# Patient Record
Sex: FEMALE | Race: WHITE | NOT HISPANIC OR LATINO | Employment: OTHER | URBAN - METROPOLITAN AREA
[De-identification: names, ages, dates, MRNs, and addresses within clinical notes are randomized per-mention and may not be internally consistent; named-entity substitution may affect disease eponyms.]

---

## 2018-02-26 ENCOUNTER — HOSPITAL LABORATORY (OUTPATIENT)
Dept: OTHER | Facility: CLINIC | Age: 58
End: 2018-02-26

## 2018-02-26 ENCOUNTER — HOSPITAL ENCOUNTER (OUTPATIENT)
Dept: LAB | Facility: CLINIC | Age: 58
Discharge: HOME | End: 2018-02-26
Attending: ORTHOPAEDIC SURGERY | Admitting: ORTHOPAEDIC SURGERY
Payer: COMMERCIAL

## 2018-02-26 LAB — HGB BLD-MCNC: 14.9 G/DL (ref 11.7–15.7)

## 2018-02-26 PROCEDURE — 87641 MR-STAPH DNA AMP PROBE: CPT | Performed by: ORTHOPAEDIC SURGERY

## 2018-02-26 PROCEDURE — 40000830 ZZHCL STATISTIC STAPH AUREUS METH RESIST SCREEN PCR: Performed by: ORTHOPAEDIC SURGERY

## 2018-02-26 PROCEDURE — 87640 STAPH A DNA AMP PROBE: CPT | Performed by: ORTHOPAEDIC SURGERY

## 2018-02-26 PROCEDURE — 40000829 ZZHCL STATISTIC STAPH AUREUS SUSCEPT SCREEN PCR: Performed by: ORTHOPAEDIC SURGERY

## 2018-02-27 ENCOUNTER — TRANSFERRED RECORDS (OUTPATIENT)
Dept: HEALTH INFORMATION MANAGEMENT | Facility: CLINIC | Age: 58
End: 2018-02-27

## 2018-03-23 ENCOUNTER — APPOINTMENT (OUTPATIENT)
Dept: GENERAL RADIOLOGY | Facility: CLINIC | Age: 58
DRG: 470 | End: 2018-03-23
Attending: ORTHOPAEDIC SURGERY
Payer: COMMERCIAL

## 2018-03-23 ENCOUNTER — ANESTHESIA EVENT (OUTPATIENT)
Dept: SURGERY | Facility: CLINIC | Age: 58
DRG: 470 | End: 2018-03-23
Payer: COMMERCIAL

## 2018-03-23 ENCOUNTER — ANESTHESIA (OUTPATIENT)
Dept: SURGERY | Facility: CLINIC | Age: 58
DRG: 470 | End: 2018-03-23
Payer: COMMERCIAL

## 2018-03-23 ENCOUNTER — HOSPITAL ENCOUNTER (INPATIENT)
Facility: CLINIC | Age: 58
LOS: 3 days | Discharge: HOME | DRG: 470 | End: 2018-03-26
Attending: ORTHOPAEDIC SURGERY | Admitting: ORTHOPAEDIC SURGERY
Payer: COMMERCIAL

## 2018-03-23 DIAGNOSIS — M17.11 ARTHRITIS OF RIGHT KNEE: Primary | ICD-10-CM

## 2018-03-23 LAB
ABO + RH BLD: NORMAL
ABO + RH BLD: NORMAL
BLD GP AB SCN SERPL QL: NORMAL
BLOOD BANK CMNT PATIENT-IMP: NORMAL
CREAT SERPL-MCNC: 0.84 MG/DL (ref 0.52–1.04)
GFR SERPL CREATININE-BSD FRML MDRD: 69 ML/MIN/1.7M2
HGB BLD-MCNC: 15.1 G/DL (ref 11.7–15.7)
PLATELET # BLD AUTO: 130 10E9/L (ref 150–450)
POTASSIUM SERPL-SCNC: 3.9 MMOL/L (ref 3.4–5.3)
SPECIMEN EXP DATE BLD: NORMAL

## 2018-03-23 PROCEDURE — 36000063 ZZH SURGERY LEVEL 4 EA 15 ADDTL MIN: Performed by: ORTHOPAEDIC SURGERY

## 2018-03-23 PROCEDURE — 25000132 ZZH RX MED GY IP 250 OP 250 PS 637: Performed by: ORTHOPAEDIC SURGERY

## 2018-03-23 PROCEDURE — 86850 RBC ANTIBODY SCREEN: CPT | Performed by: PHYSICIAN ASSISTANT

## 2018-03-23 PROCEDURE — 25000125 ZZHC RX 250: Performed by: PHYSICIAN ASSISTANT

## 2018-03-23 PROCEDURE — 40000986 XR KNEE PORT RT 1/2 VW: Mod: RT

## 2018-03-23 PROCEDURE — 25000128 H RX IP 250 OP 636: Performed by: SURGERY

## 2018-03-23 PROCEDURE — 25000132 ZZH RX MED GY IP 250 OP 250 PS 637: Performed by: PHYSICIAN ASSISTANT

## 2018-03-23 PROCEDURE — 27211024 ZZHC OR SUPPLY OTHER OPNP: Performed by: ORTHOPAEDIC SURGERY

## 2018-03-23 PROCEDURE — C1776 JOINT DEVICE (IMPLANTABLE): HCPCS | Performed by: ORTHOPAEDIC SURGERY

## 2018-03-23 PROCEDURE — 25000125 ZZHC RX 250: Performed by: NURSE ANESTHETIST, CERTIFIED REGISTERED

## 2018-03-23 PROCEDURE — 25000125 ZZHC RX 250: Performed by: SURGERY

## 2018-03-23 PROCEDURE — 40000170 ZZH STATISTIC PRE-PROCEDURE ASSESSMENT II: Performed by: ORTHOPAEDIC SURGERY

## 2018-03-23 PROCEDURE — 25000128 H RX IP 250 OP 636

## 2018-03-23 PROCEDURE — 71000012 ZZH RECOVERY PHASE 1 LEVEL 1 FIRST HR: Performed by: ORTHOPAEDIC SURGERY

## 2018-03-23 PROCEDURE — 27210794 ZZH OR GENERAL SUPPLY STERILE: Performed by: ORTHOPAEDIC SURGERY

## 2018-03-23 PROCEDURE — 36000093 ZZH SURGERY LEVEL 4 1ST 30 MIN: Performed by: ORTHOPAEDIC SURGERY

## 2018-03-23 PROCEDURE — 25000128 H RX IP 250 OP 636: Performed by: ORTHOPAEDIC SURGERY

## 2018-03-23 PROCEDURE — 27210995 ZZH RX 272: Performed by: ORTHOPAEDIC SURGERY

## 2018-03-23 PROCEDURE — C1713 ANCHOR/SCREW BN/BN,TIS/BN: HCPCS | Performed by: ORTHOPAEDIC SURGERY

## 2018-03-23 PROCEDURE — 86901 BLOOD TYPING SEROLOGIC RH(D): CPT | Performed by: PHYSICIAN ASSISTANT

## 2018-03-23 PROCEDURE — 12000007 ZZH R&B INTERMEDIATE

## 2018-03-23 PROCEDURE — 85049 AUTOMATED PLATELET COUNT: CPT | Performed by: PHYSICIAN ASSISTANT

## 2018-03-23 PROCEDURE — 0SRC0J9 REPLACEMENT OF RIGHT KNEE JOINT WITH SYNTHETIC SUBSTITUTE, CEMENTED, OPEN APPROACH: ICD-10-PCS | Performed by: ORTHOPAEDIC SURGERY

## 2018-03-23 PROCEDURE — 86900 BLOOD TYPING SEROLOGIC ABO: CPT | Performed by: PHYSICIAN ASSISTANT

## 2018-03-23 PROCEDURE — 25000128 H RX IP 250 OP 636: Performed by: PHYSICIAN ASSISTANT

## 2018-03-23 PROCEDURE — 25000125 ZZHC RX 250: Performed by: ORTHOPAEDIC SURGERY

## 2018-03-23 PROCEDURE — 37000008 ZZH ANESTHESIA TECHNICAL FEE, 1ST 30 MIN: Performed by: ORTHOPAEDIC SURGERY

## 2018-03-23 PROCEDURE — 84132 ASSAY OF SERUM POTASSIUM: CPT | Performed by: PHYSICIAN ASSISTANT

## 2018-03-23 PROCEDURE — 36415 COLL VENOUS BLD VENIPUNCTURE: CPT | Performed by: PHYSICIAN ASSISTANT

## 2018-03-23 PROCEDURE — 27810169 ZZH OR IMPLANT GENERAL: Performed by: ORTHOPAEDIC SURGERY

## 2018-03-23 PROCEDURE — 25000128 H RX IP 250 OP 636: Performed by: NURSE ANESTHETIST, CERTIFIED REGISTERED

## 2018-03-23 PROCEDURE — 37000009 ZZH ANESTHESIA TECHNICAL FEE, EACH ADDTL 15 MIN: Performed by: ORTHOPAEDIC SURGERY

## 2018-03-23 PROCEDURE — 82565 ASSAY OF CREATININE: CPT | Performed by: PHYSICIAN ASSISTANT

## 2018-03-23 PROCEDURE — 25000128 H RX IP 250 OP 636: Performed by: ANESTHESIOLOGY

## 2018-03-23 PROCEDURE — 85018 HEMOGLOBIN: CPT | Performed by: PHYSICIAN ASSISTANT

## 2018-03-23 PROCEDURE — 25800025 ZZH RX 258: Performed by: ORTHOPAEDIC SURGERY

## 2018-03-23 DEVICE — IMPLANTABLE DEVICE: Type: IMPLANTABLE DEVICE | Site: KNEE | Status: FUNCTIONAL

## 2018-03-23 DEVICE — BONE CEMENT SIMPLEX W/TOBRAMYCIN 6197-9-001: Type: IMPLANTABLE DEVICE | Status: FUNCTIONAL

## 2018-03-23 DEVICE — IMP COMP TIBIAL TRAY SNN JOURNEY NP RT SZ 5 74022215: Type: IMPLANTABLE DEVICE | Site: KNEE | Status: FUNCTIONAL

## 2018-03-23 RX ORDER — KETOROLAC TROMETHAMINE 30 MG/ML
30 INJECTION, SOLUTION INTRAMUSCULAR; INTRAVENOUS EVERY 6 HOURS
Status: COMPLETED | OUTPATIENT
Start: 2018-03-23 | End: 2018-03-24

## 2018-03-23 RX ORDER — LISINOPRIL/HYDROCHLOROTHIAZIDE 10-12.5 MG
1 TABLET ORAL DAILY
Status: DISCONTINUED | OUTPATIENT
Start: 2018-03-23 | End: 2018-03-26 | Stop reason: HOSPADM

## 2018-03-23 RX ORDER — VANCOMYCIN HCL 900 MCG/MG
POWDER (GRAM) MISCELLANEOUS PRN
Status: DISCONTINUED | OUTPATIENT
Start: 2018-03-23 | End: 2018-03-23 | Stop reason: HOSPADM

## 2018-03-23 RX ORDER — LANOLIN ALCOHOL/MO/W.PET/CERES
3 CREAM (GRAM) TOPICAL AT BEDTIME
Status: DISCONTINUED | OUTPATIENT
Start: 2018-03-23 | End: 2018-03-26 | Stop reason: HOSPADM

## 2018-03-23 RX ORDER — SODIUM CHLORIDE 9 MG/ML
INJECTION, SOLUTION INTRAVENOUS CONTINUOUS
Status: DISCONTINUED | OUTPATIENT
Start: 2018-03-23 | End: 2018-03-26 | Stop reason: HOSPADM

## 2018-03-23 RX ORDER — ONDANSETRON 2 MG/ML
4 INJECTION INTRAMUSCULAR; INTRAVENOUS EVERY 6 HOURS PRN
Status: DISCONTINUED | OUTPATIENT
Start: 2018-03-23 | End: 2018-03-26 | Stop reason: HOSPADM

## 2018-03-23 RX ORDER — HYDROMORPHONE HYDROCHLORIDE 1 MG/ML
.3-.5 INJECTION, SOLUTION INTRAMUSCULAR; INTRAVENOUS; SUBCUTANEOUS EVERY 5 MIN PRN
Status: DISCONTINUED | OUTPATIENT
Start: 2018-03-23 | End: 2018-03-23 | Stop reason: HOSPADM

## 2018-03-23 RX ORDER — FERROUS SULFATE 325(65) MG
325 TABLET ORAL
Status: DISCONTINUED | OUTPATIENT
Start: 2018-03-23 | End: 2018-03-26 | Stop reason: HOSPADM

## 2018-03-23 RX ORDER — LIDOCAINE 40 MG/G
CREAM TOPICAL
Status: DISCONTINUED | OUTPATIENT
Start: 2018-03-23 | End: 2018-03-26 | Stop reason: HOSPADM

## 2018-03-23 RX ORDER — ONDANSETRON 2 MG/ML
4 INJECTION INTRAMUSCULAR; INTRAVENOUS EVERY 30 MIN PRN
Status: DISCONTINUED | OUTPATIENT
Start: 2018-03-23 | End: 2018-03-23 | Stop reason: HOSPADM

## 2018-03-23 RX ORDER — AMOXICILLIN 250 MG
1 CAPSULE ORAL 2 TIMES DAILY
Status: DISCONTINUED | OUTPATIENT
Start: 2018-03-23 | End: 2018-03-26 | Stop reason: HOSPADM

## 2018-03-23 RX ORDER — CYCLOBENZAPRINE HCL 5 MG
5 TABLET ORAL DAILY PRN
Status: DISCONTINUED | OUTPATIENT
Start: 2018-03-23 | End: 2018-03-26 | Stop reason: HOSPADM

## 2018-03-23 RX ORDER — BUPIVACAINE HYDROCHLORIDE 7.5 MG/ML
INJECTION, SOLUTION INTRASPINAL PRN
Status: DISCONTINUED | OUTPATIENT
Start: 2018-03-23 | End: 2018-03-23

## 2018-03-23 RX ORDER — OXYCODONE HYDROCHLORIDE 5 MG/1
5-10 TABLET ORAL
Status: DISCONTINUED | OUTPATIENT
Start: 2018-03-23 | End: 2018-03-26 | Stop reason: HOSPADM

## 2018-03-23 RX ORDER — MEPERIDINE HYDROCHLORIDE 25 MG/ML
12.5 INJECTION INTRAMUSCULAR; INTRAVENOUS; SUBCUTANEOUS EVERY 5 MIN PRN
Status: DISCONTINUED | OUTPATIENT
Start: 2018-03-23 | End: 2018-03-23 | Stop reason: HOSPADM

## 2018-03-23 RX ORDER — FENTANYL CITRATE 50 UG/ML
25-50 INJECTION, SOLUTION INTRAMUSCULAR; INTRAVENOUS
Status: DISCONTINUED | OUTPATIENT
Start: 2018-03-23 | End: 2018-03-23 | Stop reason: HOSPADM

## 2018-03-23 RX ORDER — PROPOFOL 10 MG/ML
INJECTION, EMULSION INTRAVENOUS CONTINUOUS PRN
Status: DISCONTINUED | OUTPATIENT
Start: 2018-03-23 | End: 2018-03-23

## 2018-03-23 RX ORDER — LEVOTHYROXINE SODIUM 100 UG/1
200 TABLET ORAL
Status: DISCONTINUED | OUTPATIENT
Start: 2018-03-23 | End: 2018-03-26 | Stop reason: HOSPADM

## 2018-03-23 RX ORDER — ACETAMINOPHEN 325 MG/1
975 TABLET ORAL EVERY 8 HOURS
Status: DISCONTINUED | OUTPATIENT
Start: 2018-03-23 | End: 2018-03-26 | Stop reason: HOSPADM

## 2018-03-23 RX ORDER — MAGNESIUM HYDROXIDE 1200 MG/15ML
LIQUID ORAL PRN
Status: DISCONTINUED | OUTPATIENT
Start: 2018-03-23 | End: 2018-03-23 | Stop reason: HOSPADM

## 2018-03-23 RX ORDER — FENTANYL CITRATE 50 UG/ML
INJECTION, SOLUTION INTRAMUSCULAR; INTRAVENOUS PRN
Status: DISCONTINUED | OUTPATIENT
Start: 2018-03-23 | End: 2018-03-23

## 2018-03-23 RX ORDER — FERROUS SULFATE 325(65) MG
325 TABLET ORAL DAILY
COMMUNITY

## 2018-03-23 RX ORDER — KETOROLAC TROMETHAMINE 30 MG/ML
INJECTION, SOLUTION INTRAMUSCULAR; INTRAVENOUS PRN
Status: DISCONTINUED | OUTPATIENT
Start: 2018-03-23 | End: 2018-03-23 | Stop reason: HOSPADM

## 2018-03-23 RX ORDER — SODIUM CHLORIDE, SODIUM LACTATE, POTASSIUM CHLORIDE, CALCIUM CHLORIDE 600; 310; 30; 20 MG/100ML; MG/100ML; MG/100ML; MG/100ML
INJECTION, SOLUTION INTRAVENOUS CONTINUOUS
Status: DISCONTINUED | OUTPATIENT
Start: 2018-03-23 | End: 2018-03-23 | Stop reason: HOSPADM

## 2018-03-23 RX ORDER — FLUTICASONE PROPIONATE 50 MCG
2 SPRAY, SUSPENSION (ML) NASAL DAILY PRN
COMMUNITY

## 2018-03-23 RX ORDER — FLUTICASONE PROPIONATE 50 MCG
2 SPRAY, SUSPENSION (ML) NASAL DAILY PRN
Status: DISCONTINUED | OUTPATIENT
Start: 2018-03-23 | End: 2018-03-26 | Stop reason: HOSPADM

## 2018-03-23 RX ORDER — ONDANSETRON 4 MG/1
4 TABLET, ORALLY DISINTEGRATING ORAL EVERY 30 MIN PRN
Status: DISCONTINUED | OUTPATIENT
Start: 2018-03-23 | End: 2018-03-23 | Stop reason: HOSPADM

## 2018-03-23 RX ORDER — AMOXICILLIN 250 MG
2 CAPSULE ORAL 2 TIMES DAILY
Status: DISCONTINUED | OUTPATIENT
Start: 2018-03-23 | End: 2018-03-26 | Stop reason: HOSPADM

## 2018-03-23 RX ORDER — NALOXONE HYDROCHLORIDE 0.4 MG/ML
.1-.4 INJECTION, SOLUTION INTRAMUSCULAR; INTRAVENOUS; SUBCUTANEOUS
Status: DISCONTINUED | OUTPATIENT
Start: 2018-03-23 | End: 2018-03-26 | Stop reason: HOSPADM

## 2018-03-23 RX ORDER — DIPHENHYDRAMINE HYDROCHLORIDE 50 MG/ML
25 INJECTION INTRAMUSCULAR; INTRAVENOUS EVERY 6 HOURS PRN
Status: DISCONTINUED | OUTPATIENT
Start: 2018-03-23 | End: 2018-03-26 | Stop reason: HOSPADM

## 2018-03-23 RX ORDER — FENTANYL CITRATE 50 UG/ML
25-50 INJECTION, SOLUTION INTRAMUSCULAR; INTRAVENOUS
Status: COMPLETED | OUTPATIENT
Start: 2018-03-23 | End: 2018-03-23

## 2018-03-23 RX ORDER — DIPHENHYDRAMINE HCL 25 MG
25 CAPSULE ORAL EVERY 6 HOURS PRN
Status: DISCONTINUED | OUTPATIENT
Start: 2018-03-23 | End: 2018-03-26 | Stop reason: HOSPADM

## 2018-03-23 RX ORDER — ONDANSETRON 4 MG/1
4 TABLET, ORALLY DISINTEGRATING ORAL EVERY 6 HOURS PRN
Status: DISCONTINUED | OUTPATIENT
Start: 2018-03-23 | End: 2018-03-26 | Stop reason: HOSPADM

## 2018-03-23 RX ORDER — ONDANSETRON 2 MG/ML
INJECTION INTRAMUSCULAR; INTRAVENOUS PRN
Status: DISCONTINUED | OUTPATIENT
Start: 2018-03-23 | End: 2018-03-23

## 2018-03-23 RX ORDER — NALOXONE HYDROCHLORIDE 0.4 MG/ML
.1-.4 INJECTION, SOLUTION INTRAMUSCULAR; INTRAVENOUS; SUBCUTANEOUS
Status: ACTIVE | OUTPATIENT
Start: 2018-03-23 | End: 2018-03-24

## 2018-03-23 RX ADMIN — LEVOTHYROXINE SODIUM 200 MCG: 100 TABLET ORAL at 18:18

## 2018-03-23 RX ADMIN — SERTRALINE HYDROCHLORIDE 150 MG: 100 TABLET ORAL at 18:18

## 2018-03-23 RX ADMIN — PROPOFOL 100 MCG/KG/MIN: 10 INJECTION, EMULSION INTRAVENOUS at 13:38

## 2018-03-23 RX ADMIN — PHENYLEPHRINE HYDROCHLORIDE 100 MCG: 10 INJECTION INTRAVENOUS at 15:20

## 2018-03-23 RX ADMIN — BUPIVACAINE HYDROCHLORIDE 20 ML GIVEN: 2.5 INJECTION, SOLUTION EPIDURAL; INFILTRATION; INTRACAUDAL at 12:19

## 2018-03-23 RX ADMIN — MIDAZOLAM HYDROCHLORIDE 1 MG: 1 INJECTION, SOLUTION INTRAMUSCULAR; INTRAVENOUS at 12:16

## 2018-03-23 RX ADMIN — FERROUS SULFATE TAB 325 MG (65 MG ELEMENTAL FE) 325 MG: 325 (65 FE) TAB at 18:18

## 2018-03-23 RX ADMIN — SENNOSIDES AND DOCUSATE SODIUM 2 TABLET: 8.6; 5 TABLET ORAL at 20:28

## 2018-03-23 RX ADMIN — TRANEXAMIC ACID 1 G: 100 INJECTION, SOLUTION INTRAVENOUS at 15:15

## 2018-03-23 RX ADMIN — PHENYLEPHRINE HYDROCHLORIDE 100 MCG: 10 INJECTION INTRAVENOUS at 15:13

## 2018-03-23 RX ADMIN — BUPIVACAINE HYDROCHLORIDE IN DEXTROSE 13.5 MG: 7.5 INJECTION, SOLUTION SUBARACHNOID at 13:34

## 2018-03-23 RX ADMIN — MELATONIN 3 MG: 3 TAB ORAL at 23:40

## 2018-03-23 RX ADMIN — OXYCODONE HYDROCHLORIDE 10 MG: 5 TABLET ORAL at 23:40

## 2018-03-23 RX ADMIN — OXYCODONE HYDROCHLORIDE 5 MG: 5 TABLET ORAL at 20:28

## 2018-03-23 RX ADMIN — FENTANYL CITRATE 50 MCG: 50 INJECTION INTRAMUSCULAR; INTRAVENOUS at 12:13

## 2018-03-23 RX ADMIN — FENTANYL CITRATE 25 MCG: 50 INJECTION, SOLUTION INTRAMUSCULAR; INTRAVENOUS at 15:09

## 2018-03-23 RX ADMIN — PHENYLEPHRINE HYDROCHLORIDE 100 MCG: 10 INJECTION INTRAVENOUS at 14:23

## 2018-03-23 RX ADMIN — DEXMEDETOMIDINE HYDROCHLORIDE 4 MCG: 100 INJECTION, SOLUTION INTRAVENOUS at 13:57

## 2018-03-23 RX ADMIN — Medication 1.5 G: at 13:30

## 2018-03-23 RX ADMIN — ONDANSETRON 4 MG: 2 INJECTION INTRAMUSCULAR; INTRAVENOUS at 15:33

## 2018-03-23 RX ADMIN — KETOROLAC TROMETHAMINE 30 MG: 30 INJECTION, SOLUTION INTRAMUSCULAR at 20:28

## 2018-03-23 RX ADMIN — MIDAZOLAM 1 MG: 1 INJECTION INTRAMUSCULAR; INTRAVENOUS at 14:02

## 2018-03-23 RX ADMIN — OXYCODONE HYDROCHLORIDE 5 MG: 5 TABLET ORAL at 19:34

## 2018-03-23 RX ADMIN — LISINOPRIL AND HYDROCHLOROTHIAZIDE 1 TABLET: 12.5; 1 TABLET ORAL at 18:19

## 2018-03-23 RX ADMIN — FENTANYL CITRATE 25 MCG: 50 INJECTION, SOLUTION INTRAMUSCULAR; INTRAVENOUS at 14:50

## 2018-03-23 RX ADMIN — TRANEXAMIC ACID 1 G: 100 INJECTION, SOLUTION INTRAVENOUS at 13:48

## 2018-03-23 RX ADMIN — MIDAZOLAM 1 MG: 1 INJECTION INTRAMUSCULAR; INTRAVENOUS at 13:33

## 2018-03-23 RX ADMIN — SODIUM CHLORIDE, POTASSIUM CHLORIDE, SODIUM LACTATE AND CALCIUM CHLORIDE: 600; 310; 30; 20 INJECTION, SOLUTION INTRAVENOUS at 11:59

## 2018-03-23 RX ADMIN — PHENYLEPHRINE HYDROCHLORIDE 100 MCG: 10 INJECTION INTRAVENOUS at 15:34

## 2018-03-23 RX ADMIN — ACETAMINOPHEN 975 MG: 325 TABLET, FILM COATED ORAL at 23:40

## 2018-03-23 RX ADMIN — TOBRAMYCIN SULFATE 160 MG: 40 INJECTION, SOLUTION INTRAMUSCULAR; INTRAVENOUS at 13:38

## 2018-03-23 RX ADMIN — PHENYLEPHRINE HYDROCHLORIDE 100 MCG: 10 INJECTION INTRAVENOUS at 14:52

## 2018-03-23 RX ADMIN — SODIUM CHLORIDE: 9 INJECTION, SOLUTION INTRAVENOUS at 18:18

## 2018-03-23 RX ADMIN — DEXMEDETOMIDINE HYDROCHLORIDE 4 MCG: 100 INJECTION, SOLUTION INTRAVENOUS at 13:46

## 2018-03-23 ASSESSMENT — ACTIVITIES OF DAILY LIVING (ADL)
COGNITION: 0 - NO COGNITION ISSUES REPORTED
BATHING: 0-->INDEPENDENT
TRANSFERRING: 0-->INDEPENDENT
TOILETING: 0-->INDEPENDENT
RETIRED_COMMUNICATION: 0-->UNDERSTANDS/COMMUNICATES WITHOUT DIFFICULTY
AMBULATION: 1-->ASSISTIVE EQUIPMENT
FALL_HISTORY_WITHIN_LAST_SIX_MONTHS: NO
DRESS: 0-->INDEPENDENT
RETIRED_EATING: 0-->INDEPENDENT
WHICH_OF_THE_ABOVE_FUNCTIONAL_RISKS_HAD_A_RECENT_ONSET_OR_CHANGE?: AMBULATION
SWALLOWING: 0-->SWALLOWS FOODS/LIQUIDS WITHOUT DIFFICULTY

## 2018-03-23 ASSESSMENT — COPD QUESTIONNAIRES: COPD: 0

## 2018-03-23 ASSESSMENT — ENCOUNTER SYMPTOMS: DYSRHYTHMIAS: 0

## 2018-03-23 NOTE — ANESTHESIA POSTPROCEDURE EVALUATION
Patient: Elizabeth Blanco    Procedure(s):  RIGHT TOTAL KNEE ARTHROPLASTY - Wound Class: I-Clean    Diagnosis:END STAGE ARTHRITIS RIGHT KNEE   Diagnosis Additional Information: No value filed.    Anesthesia Type:  Spinal, Periph. Nerve Block for postop pain    Note:  Anesthesia Post Evaluation    Patient location during evaluation: PACU  Patient participation: Able to fully participate in evaluation  Level of consciousness: awake  Pain management: adequate  Airway patency: patent  Cardiovascular status: acceptable  Respiratory status: acceptable  Hydration status: acceptable  PONV: none     Anesthetic complications: None          Last vitals:  Vitals:    03/23/18 1630 03/23/18 1640 03/23/18 1650   BP: 109/68 106/66 113/73   Resp: 14 15 18   Temp: 35.4  C (95.7  F) 36.2  C (97.1  F)    SpO2: 97% 97% 98%         Electronically Signed By: Kaz Desai MD  March 23, 2018  5:01 PM

## 2018-03-23 NOTE — CONSULTS
Elizabeth Blanco is a 57 year old female with PMHx of osteoarthritis, essential hypertension, depression, and hypothryoidism who is s/p right total knee arthroplasty for end stage arthritis of the right knee. Surgery performed by Dr. Tyler. Spinal and peripheral nerve block utilized for anesthesia. Pt tolerated surgery well. EBL 75 ccs. Vitals currently WNL. Pt currently stable. Pre-op assessment reviewed. Essential medications including Zoloft (depression), Synthroid (hypothyroidism), and Prinzide (essential HTN) reordered. Creatinine on AM of surgery was WNL at 0.84. Discussed with bedside RN who has no acute concerns at this time.     Given the above, will defer formal consult. Routine post-operative cares, IVF, DVT prophylaxis, and pain management to orthopedic service. Will check some basic lab work in the AM to assess for acute blood loss anemia given surgery. PT and OT to assess per Ortho. Bowel regimen in place while on pain regimen. No changes to PTA medication regimen at discharge unless issues arise throughout stay.  Happy to be reconsulted in the future if necessary. Appreciate consult.

## 2018-03-23 NOTE — BRIEF OP NOTE
[facility] Brief Operative Note    Pre-operative diagnosis: END STAGE ARTHRITIS RIGHT KNEE    Post-operative diagnosis SAME   Procedure: Procedure(s):  RIGHT TOTAL KNEE ARTHROPLASTY - Wound Class: I-Clean   Surgeon(s): Surgeon(s) and Role:     * Bill Tyler MD - Primary     * Cr Wang PA-C   Estimated blood loss: 75 mL    Specimens: * No specimens in log *   Findings: MEDIAL AND PF ARTHRITIS

## 2018-03-23 NOTE — ANESTHESIA PROCEDURE NOTES
Peripheral nerve/Neuraxial procedure note : intrathecal  Pre-Procedure  Performed by DRISS BARRAGAN  Location: OR      Pre-Anesthestic Checklist: patient identified, IV checked, risks and benefits discussed, informed consent, monitors and equipment checked, pre-op evaluation and at physician/surgeon's request    Timeout  Correct Patient: Yes   Correct Procedure: Yes   Correct Site: Yes   Correct Laterality: N/A   Correct Position: Yes   Site Marked: N/A   .   Procedure Documentation    .    Procedure:    Intrathecal.  Insertion Site:L3-4  (midline approach)      Patient Prep;mask, sterile gloves, povidone-iodine 7.5% surgical scrub, patient draped.  .  Needle: Reina-Rich Spinal Needle (gauge): 24  Spinal/LP Needle Length (inches): 3.5 # of attempts: 1 and # of redirects: : 0. Introducer used Introducer: 20 G .       Assessment/Narrative  Paresthesias: No.  .  .  clear CSF fluid removed . Comments:  Injected 13.5mg Bupivacaine 0.75% + dextrose + 100 mcg Duramorph  Patient tolerated the procedure well and without complications.  Patient laid flat immediately.

## 2018-03-23 NOTE — ANESTHESIA CARE TRANSFER NOTE
Patient: Elizabeth Blanco    Procedure(s):  RIGHT TOTAL KNEE ARTHROPLASTY - Wound Class: I-Clean    Diagnosis: END STAGE ARTHRITIS RIGHT KNEE   Diagnosis Additional Information: No value filed.    Anesthesia Type:   Spinal, Periph. Nerve Block for postop pain     Note:  Airway :Face Mask  Patient transferred to:PACU  Comments: To pacu bed 4. Vss. Denies pain. Report given to RN assuming care of pt.       Vitals: (Last set prior to Anesthesia Care Transfer)    CRNA VITALS  3/23/2018 1523 - 3/23/2018 1558      3/23/2018             Resp Rate (set): 10                Electronically Signed By: JESSICA Lazar CRNA  March 23, 2018  3:58 PM

## 2018-03-23 NOTE — IP AVS SNAPSHOT
[address] [facility]    [address] [place] [address]    Phone:  [phone]                                       After Visit Summary   3/23/2018    Elizabeth Blanco    MRN: [id number]           After Visit Summary Signature Page     I have received my discharge instructions, and my questions have been answered. I have discussed any challenges I see with this plan with the nurse or doctor.    ..........................................................................................................................................  Patient/Patient Representative Signature      ..........................................................................................................................................  Patient Representative Print Name and Relationship to Patient    ..................................................               ................................................  Date                                            Time    ..........................................................................................................................................  Reviewed by Signature/Title    ...................................................              ..............................................  Date                                                            Time

## 2018-03-23 NOTE — ANESTHESIA PROCEDURE NOTES
Peripheral nerve/Neuraxial procedure note : Femoral (via adductor canal approach)  Pre-Procedure  Performed by DRISS BARRAGAN  Location: pre-op      Pre-Anesthestic Checklist: patient identified, IV checked, site marked, risks and benefits discussed, informed consent, monitors and equipment checked, pre-op evaluation, at physician/surgeon's request and post-op pain management    Timeout  Correct Patient: Yes   Correct Procedure: Yes   Correct Site: Yes   Correct Laterality: Yes   Correct Position: Yes   Site Marked: Yes   .   Procedure Documentation    .    Procedure:  right  Femoral (via adductor canal approach).  Local skin infiltrated with 3 mL of 1% lidocaine.     Ultrasound used to identify targeted nerve, plexus, or vascular marker and placed a needle adjacent to it., Ultrasound was used to visualize the spread of the anesthetic in close proximity to the above stated nerve. A permanent image is entered into the patient's record.  Patient Prep;mask, sterile gloves, chlorhexidine gluconate and isopropyl alcohol, patient draped.  .  Needle: insulated Needle Gauge: 21.    Needle Length (Inches) 3.5  Insertion Method: Single Shot.       Assessment/Narrative  Paresthesias: No.  Injection made incrementally with aspirations every 5 mL..  The placement was negative for: blood aspirated, painful injection and site bleeding.  Bolus given via needle..   Secured via.   Complications: none. Comments:  Medication: 20cc of 0.5% bupivacaine with 1:400k epinephrine  Braymer-dissection with saline, 5cc  Dose given via 5cc increments with negative aspiration

## 2018-03-23 NOTE — ANESTHESIA PREPROCEDURE EVALUATION
Procedure: Procedure(s):  ARTHROPLASTY KNEE  Preop diagnosis: END STAGE ARTHRITIS RIGHT KNEE     Allergies   Allergen Reactions     Codeine Sulfate Other (See Comments)     headache     Penicillins Rash     Sulfa Drugs Rash     Past Medical History:   Diagnosis Date     Anemia     iron deficiency anemia     Arthritis      Degenerative joint disease      Depression      Gastro-oesophageal reflux disease      History of suicide attempt      Hypertension      Impaired fasting glucose      Long term (current) use of non-steroidal anti-inflammatories (nsaid)      Migraine      Positive fecal immunochemical test      Thrombocytopenia (H)      Thyroid disease     hypothroid     Trochanteric bursitis, left hip      Varicose veins of legs      Past Surgical History:   Procedure Laterality Date     ARTHROPLASTY KNEE  11/15/2011    Procedure:ARTHROPLASTY KNEE; LEFT TOTAL KNEE ARTHROPLASTY (SMITH & NEPHEW)^ (FEMORAL BLOCK); Surgeon:ZOEY GIBBS; Location:[facility] OR     BIOPSY      breast, benign     COLONOSCOPY      ileum ulcer     GYN SURGERY      hysterectomy     VASCULAR SURGERY      varicose veins     Social History   Substance Use Topics     Smoking status: Never Smoker     Smokeless tobacco: Never Used     Alcohol use No     Prior to Admission medications    Medication Sig Start Date End Date Taking? Authorizing Provider   fluticasone (FLONASE) 50 MCG/ACT spray Spray 2 sprays into both nostrils daily as needed for rhinitis or allergies   Yes Reported, Patient   ferrous sulfate (IRON) 325 (65 FE) MG tablet Take 325 mg by mouth daily   Yes Reported, Patient   aspirin-acetaminophen-caffeine (EXCEDRIN EXTRA STRENGTH) 250-250-65 MG per tablet Take 2 tablets by mouth 2 times daily as needed for headaches   Yes Reported, Patient   Cyclobenzaprine HCl (FLEXERIL PO) Take 5 mg by mouth daily as needed for muscle spasms   Yes Reported, Patient   MELATONIN GUMMIES PO Take 0.5 chew tab by mouth At Bedtime   Yes Reported, Patient    LEVOTHYROXINE SODIUM PO Take 200 mcg by mouth daily   Yes Reported, Patient   SERTRALINE HCL PO Take 150 mg by mouth daily (Takes 1.5 x 100mg tablet = 150mg dose)   Yes Reported, Patient   mupirocin (BACTROBAN) 2 % nasal ointment Apply 1 each into each nare 2 times daily 3/16/18 3/23/18 Yes Reported, Patient   lisinopril-hydrochlorothiazide (PRINZIDE,ZESTORETIC) 10-12.5 MG per tablet Take 1 tablet by mouth daily 8/27/14  Yes AndLonnie velez MD     Current Facility-Administered Medications Ordered in Epic   Medication Dose Route Frequency Last Rate Last Dose     vancomycin (VANCOCIN) 1500 mg in 0.9% NaCl 250 mL PREMIX  1,500 mg Intravenous Pre-Op/Pre-procedure x 1 dose         vancomycin (VANCOCIN) 1500 mg in 0.9% NaCl 250 mL PREMIX  1,500 mg Intravenous See Admin Instructions         tranexamic acid (CYKLOKAPRON) 1 g in sodium chloride 0.9 % 60 mL bolus  1 g Intravenous Once         tranexamic acid (CYKLOKAPRON) 1 g in sodium chloride 0.9 % 60 mL bolus  1 g Intravenous Once         tobramycin (NEBCIN) 160 mg in sodium chloride 0.9 % intermittent infusion  2 mg/kg (Adjusted) Intravenous Once         lidocaine 1 % 1 mL  1 mL Other Q1H PRN         lactated ringers infusion   Intravenous Continuous         No current [facility]-ordered outpatient prescriptions on file.       lactated ringers       Wt Readings from Last 1 Encounters:   03/23/18 113 kg (249 lb 1.9 oz)     Temp Readings from Last 1 Encounters:   03/23/18 35.9  C (96.7  F)     BP Readings from Last 6 Encounters:   03/23/18 (!) 141/92   11/22/16 120/78   04/19/16 120/70   08/19/15 166/87   08/25/14 110/72   08/23/14 162/85     Pulse Readings from Last 4 Encounters:   11/22/16 70   04/19/16 79   08/19/15 79   08/25/14 92     Resp Readings from Last 1 Encounters:   08/19/15 18     SpO2 Readings from Last 1 Encounters:   03/23/18 98%     Recent Labs   Lab Test  08/19/15   1305  08/24/14   0735   NA  141  144   POTASSIUM  3.9  4.0   CHLORIDE  106  110*   CO2   28  29   ANIONGAP  7  5*   GLC  94  105*   BUN  21  16   CR  0.79  0.94   LEO  8.6  8.9     No results for input(s): AST, ALT, ALKPHOS, BILITOTAL, LIPASE in the last 69636 hours.  Recent Labs   Lab Test  03/23/18   1128  02/26/18   1407  08/19/15   1305  08/24/14   0735   WBC   --    --   5.4  5.6   HGB  15.1  14.9  10.4*  11.8   PLT  130*   --   129*  131*     Recent Labs   Lab Test  03/23/18   1128  11/15/11   1040   ABO  PENDING  A   RH   --    Pos     Recent Labs   Lab Test  08/19/15   1430  08/19/15   1305   INR  1.05  Unsatisfactory specimen - tube underfilled      No results for input(s): TROPI in the last 60494 hours.  No results for input(s): PH, PCO2, PO2, HCO3 in the last 26096 hours.  No results for input(s): HCG in the last 41190 hours.  No results found for this or any previous visit (from the past 744 hour(s)).    RECENT LABS:   ECG:   ECHO:       Anesthesia Evaluation     .             ROS/MED HX    ENT/Pulmonary:     (+)allergic rhinitis, , . .   (-) COPD   Neurologic:     (+)migraines,     Cardiovascular:     (+) hypertension----. : . . . :. .      (-) CHF, arrhythmias and valvular problems/murmurs   METS/Exercise Tolerance:  >4 METS   Hematologic:     (+) Anemia, -      Musculoskeletal:   (+) arthritis, , , -       GI/Hepatic:     (+) GERD       Renal/Genitourinary:         Endo:     (+) thyroid problem hypothyroidism, .      Psychiatric:     (+) psychiatric history depression      Infectious Disease:         Malignancy:         Other:                     Physical Exam  Normal systems: dental    Airway   Mallampati: II  TM distance: >3 FB  Neck ROM: full    Dental     Cardiovascular   Rhythm and rate: normal      Pulmonary    breath sounds clear to auscultation                    Anesthesia Plan      History & Physical Review  History and physical reviewed and following examination; no interval change.    ASA Status:  3 .    NPO Status:  > 8 hours    Plan for Spinal and Periph. Nerve Block for  postop pain   PONV prophylaxis:  Ondansetron (or other 5HT-3) and Dexamethasone or Solumedrol       Postoperative Care  Postoperative pain management:  IV analgesics, Peripheral nerve block (Single Shot) and Multi-modal analgesia.      Consents  Anesthetic plan, risks, benefits and alternatives discussed with:  Patient..                          .

## 2018-03-24 ENCOUNTER — APPOINTMENT (OUTPATIENT)
Dept: PHYSICAL THERAPY | Facility: CLINIC | Age: 58
DRG: 470 | End: 2018-03-24
Attending: ORTHOPAEDIC SURGERY
Payer: COMMERCIAL

## 2018-03-24 LAB — HGB BLD-MCNC: 12.4 G/DL (ref 11.7–15.7)

## 2018-03-24 PROCEDURE — 85018 HEMOGLOBIN: CPT | Performed by: ORTHOPAEDIC SURGERY

## 2018-03-24 PROCEDURE — 25000128 H RX IP 250 OP 636: Performed by: ORTHOPAEDIC SURGERY

## 2018-03-24 PROCEDURE — 97161 PT EVAL LOW COMPLEX 20 MIN: CPT | Mod: GP | Performed by: PHYSICAL THERAPIST

## 2018-03-24 PROCEDURE — 97110 THERAPEUTIC EXERCISES: CPT | Mod: GP | Performed by: PHYSICAL THERAPIST

## 2018-03-24 PROCEDURE — 97530 THERAPEUTIC ACTIVITIES: CPT | Mod: GP | Performed by: PHYSICAL THERAPIST

## 2018-03-24 PROCEDURE — 12000007 ZZH R&B INTERMEDIATE

## 2018-03-24 PROCEDURE — 97116 GAIT TRAINING THERAPY: CPT | Mod: GP | Performed by: PHYSICAL THERAPIST

## 2018-03-24 PROCEDURE — 25000132 ZZH RX MED GY IP 250 OP 250 PS 637: Performed by: PHYSICIAN ASSISTANT

## 2018-03-24 PROCEDURE — 25000132 ZZH RX MED GY IP 250 OP 250 PS 637: Performed by: ORTHOPAEDIC SURGERY

## 2018-03-24 PROCEDURE — 36415 COLL VENOUS BLD VENIPUNCTURE: CPT | Performed by: ORTHOPAEDIC SURGERY

## 2018-03-24 PROCEDURE — 40000193 ZZH STATISTIC PT WARD VISIT: Performed by: PHYSICAL THERAPIST

## 2018-03-24 RX ADMIN — SODIUM CHLORIDE: 9 INJECTION, SOLUTION INTRAVENOUS at 11:39

## 2018-03-24 RX ADMIN — ACETAMINOPHEN 975 MG: 325 TABLET, FILM COATED ORAL at 22:54

## 2018-03-24 RX ADMIN — KETOROLAC TROMETHAMINE 30 MG: 30 INJECTION, SOLUTION INTRAMUSCULAR at 13:54

## 2018-03-24 RX ADMIN — FERROUS SULFATE TAB 325 MG (65 MG ELEMENTAL FE) 325 MG: 325 (65 FE) TAB at 12:32

## 2018-03-24 RX ADMIN — MELATONIN 3 MG: 3 TAB ORAL at 22:54

## 2018-03-24 RX ADMIN — OXYCODONE HYDROCHLORIDE 10 MG: 5 TABLET ORAL at 03:46

## 2018-03-24 RX ADMIN — KETOROLAC TROMETHAMINE 30 MG: 30 INJECTION, SOLUTION INTRAMUSCULAR at 09:38

## 2018-03-24 RX ADMIN — ACETAMINOPHEN 975 MG: 325 TABLET, FILM COATED ORAL at 13:54

## 2018-03-24 RX ADMIN — ENOXAPARIN SODIUM 40 MG: 40 INJECTION SUBCUTANEOUS at 15:58

## 2018-03-24 RX ADMIN — SODIUM CHLORIDE: 9 INJECTION, SOLUTION INTRAVENOUS at 03:48

## 2018-03-24 RX ADMIN — OXYCODONE HYDROCHLORIDE 10 MG: 5 TABLET ORAL at 07:25

## 2018-03-24 RX ADMIN — VANCOMYCIN HYDROCHLORIDE 1500 MG: 5 INJECTION, POWDER, LYOPHILIZED, FOR SOLUTION INTRAVENOUS at 01:46

## 2018-03-24 RX ADMIN — SENNOSIDES AND DOCUSATE SODIUM 2 TABLET: 8.6; 5 TABLET ORAL at 20:13

## 2018-03-24 RX ADMIN — OXYCODONE HYDROCHLORIDE 10 MG: 5 TABLET ORAL at 10:33

## 2018-03-24 RX ADMIN — KETOROLAC TROMETHAMINE 30 MG: 30 INJECTION, SOLUTION INTRAMUSCULAR at 01:53

## 2018-03-24 RX ADMIN — SENNOSIDES AND DOCUSATE SODIUM 1 TABLET: 8.6; 5 TABLET ORAL at 09:42

## 2018-03-24 RX ADMIN — LISINOPRIL AND HYDROCHLOROTHIAZIDE 1 TABLET: 12.5; 1 TABLET ORAL at 09:39

## 2018-03-24 RX ADMIN — OXYCODONE HYDROCHLORIDE 10 MG: 5 TABLET ORAL at 13:54

## 2018-03-24 RX ADMIN — OXYCODONE HYDROCHLORIDE 10 MG: 5 TABLET ORAL at 20:12

## 2018-03-24 RX ADMIN — OXYCODONE HYDROCHLORIDE 10 MG: 5 TABLET ORAL at 16:55

## 2018-03-24 RX ADMIN — SERTRALINE HYDROCHLORIDE 150 MG: 100 TABLET ORAL at 09:39

## 2018-03-24 RX ADMIN — OXYCODONE HYDROCHLORIDE 10 MG: 5 TABLET ORAL at 23:17

## 2018-03-24 RX ADMIN — ACETAMINOPHEN 975 MG: 325 TABLET, FILM COATED ORAL at 06:22

## 2018-03-24 RX ADMIN — LEVOTHYROXINE SODIUM 200 MCG: 100 TABLET ORAL at 06:22

## 2018-03-24 NOTE — PLAN OF CARE
Problem: Knee Arthroplasty (Total, Partial) (Adult)  Goal: Signs and Symptoms of Listed Potential Problems Will be Absent, Minimized or Managed (Knee Arthroplasty)  Signs and symptoms of listed potential problems will be absent, minimized or managed by discharge/transition of care (reference Knee Arthroplasty (Total, Partial) (Adult) CPG).   Outcome: Improving  Pt alert and oriented, was dangled to BSC, bladder scanned 199cc, and void 300cc, iv infusing, oxycodone for pain control.

## 2018-03-24 NOTE — PLAN OF CARE
Problem: Knee Arthroplasty (Total, Partial) (Adult)  Goal: Signs and Symptoms of Listed Potential Problems Will be Absent, Minimized or Managed (Knee Arthroplasty)  Signs and symptoms of listed potential problems will be absent, minimized or managed by discharge/transition of care (reference Knee Arthroplasty (Total, Partial) (Adult) CPG).   Outcome: No Change  A&O X4. Up from PACU @ 1740. Pt still has numbness from periph block, otherwise CMS intact. Drg CDI. Pt denies pain. Still BR, not yet dangled. Tolerating reg diet well. Pt carl ROJAS removed in pacu. VSS on RA.

## 2018-03-24 NOTE — OP NOTE
Procedure Date: 03/23/2018      PREOPERATIVE DIAGNOSIS:  End-stage osteoarthritis of the right knee.      POSTOPERATIVE DIAGNOSIS:  End-stage osteoarthritis of the right knee.      PROCEDURE:  Right total knee arthroplasty.      SURGEON:  Bill Tyler MD      ASSISTANT:  Cr Wang PA-C      INDICATIONS:  Elizabeth Blanco is a 57-year-old female with severe medial compartment and patellofemoral arthritis of her right knee.  She has tried conservative measures to include injections that are no longer working.  She is significantly disabled.  Some 7 years ago, I did the left knee, which she had good results from, and she came in requesting a right knee replacement.      After the risks, benefits and possible outcomes were explained, she elected to proceed with total knee arthroplasty.      Antibiotic, 1.25 grams of vancomycin, was given preoperatively.  She will be on 24 hours of IV antibiotics and she will be given Lovenox for DVT prophylaxis.  She received tranexamic acid at the beginning and at the end of the case.      DESCRIPTION OF PROCEDURE:  The patient was brought to the operating room, underwent spinal anesthesia without difficulty, placed supine on the operative table with a bump underneath her right hip, and she was prepped and draped in a sterile fashion.  She had a thigh tourniquet applied before prepping and draping.  We paused to ensure laterality and procedure.      We made a longitudinal incision from about 3 or 4 fingerbreadths above the patella to the medial side of the tibial tubercle.  We then developed medial and lateral flaps and did a medial parapatellar approach to the joint.  When opening the joint, she had a bunch of old blood in her knee joint.  She had a lot of synovitis up by her quad tendon and we debrided some of this synovium just to alleviate some of the risk of bleeding.  We did a retropatellar fat pad-ectomy and then did open her up medially, very marginally, just along the  joint line, and did not do a big medial release.  We then took down the ACL and everted the patella after a small lateral release from inside out, and then drilled the femur with a 12 mm drill into the intramedullary canal and sized it to a size 7 Journey II knee.  We got our rotation at about 3-1/2 degrees.  We used both the implant, transepicondylar access and Whitesides line.  Once we did that, we did our finishing block, size 7, and cut the anterior, posterior, and chamfer cuts.  She had excellent bone in this area.  We then put retractors around the tibia and anteriorly subluxed the tibia.  We removed both medial and lateral menisci completely and then prepared to cut the tibia.  We used an external cutting guide and we had previously marked the tibial spine all the way up and down.  We cut the knee 9 degrees to the ankle and took 11 mm off the lateral side.  We sized it to a size 5.  It covered the bone very well, both medially and laterally.  We then punched the tibia.  We then removed the posterior osteophytes and the rest of the menisci.  We cut the posterior cruciate ligament and then put in our components.  She was between a size 10 and a 12.  At this point, she had good stability and good medial and lateral stability through extension, mid-range and flexion.  We then freehand cut our patella.  The center portion of her patella was completely worn and we cut her from a 20 down to about a 14 mm patella.  We then sized it to a 35 and did a little patelloplasty laterally to ensure no overhang.      We then went back and did the posterior stabilized box of the femur after we sized it and moved it to just the edge of the medial side of the tibia.  It overhung laterally by about 2-3 mm, but anterior posteriorly, she was very secure.      We then thoroughly irrigated all the bony surfaces and then dried them thoroughly.  We cemented in the tibia, femur and patella in order.  We held the knee perfectly still with  a size 11 polyethylene spacer in place and using warm Betadine solution for greater than 5 minutes while we allowed the cement to cure completely.  We did not move the prosthesis.  After the cement had completely cured, we removed excess cement.  We trialed up to a 12 and the 12 was too tight and the 11 seemed to fit the best, so we put an actual 11 polyethylene spacer in and the patella tracked very nicely.  We then irrigated the knee copiously with pulse lavage, gave 1 more gram of tranexamic acid at this point and then closed the fascial layer with no drain after putting in a gram of vancomycin into the joint.  We then closed with #1 for the fascial layer, #1 Vicryl, 2-0 Vicryl and staples for the skin.      She was then placed into a sterile compressive dressing.  At the end of the case, sponge and needle count were correct x 2.  There were no apparent complications.  She tolerated the procedure very well and was transferred to the [facility] in stable condition.  Her Marcial that was placed at the beginning will be removed.         ZOEY GIBBS MD             D: 2018   T: 2018   MT: DEON      Name:     ROBERT GAMEZ   MRN:      [id number]        Account:        [id number]   :      1960           Procedure Date: 2018      Document: [id number]

## 2018-03-24 NOTE — PROGRESS NOTES
"ORTHOPEDIC PROGRESS NOTE LOWER EXTREMITY    PROCEDURE: right knee replacement  POD 1  PAIN: moderate  NAUSEA: absent    Blood pressure 114/55, pulse 71, temperature 97.7  F (36.5  C), temperature source Oral, resp. rate 15, height 1.753 m (5' 9\"), weight 113 kg (249 lb 1.9 oz), SpO2 95 %.    Temp (24hrs), Av.7  F (35.9  C), Min:95  F (35  C), Max:97.9  F (36.6  C)    Body mass index is 36.79 kg/(m^2).          Intake/Output Summary (Last 24 hours) at 18 0816  Last data filed at 18 0746   Gross per 24 hour   Intake             1707 ml   Output              550 ml   Net             1157 ml       Patient Vitals for the past 48 hrs:   BP Temp Temp src Pulse Heart Rate Resp SpO2 Height Weight   18 0744 114/55 97.7  F (36.5  C) Oral 71 - 15 95 % - -   18 0725 - - - - - 13 - - -   18 0404 114/62 97.8  F (36.6  C) Oral 70 66 19 94 % - -   18 2340 126/61 97.9  F (36.6  C) Oral 80 78 24 91 % - -   18 122/69 - - - 72 25 99 % - -   18 1934 - - - - - 22 - - -   18 1920 129/69 - - 79 - 26 93 % - -   18 1850 126/71 - - 75 - 22 95 % - -   18 1840 - - - - - 16 - - -   18 1820 125/78 - - 70 - 16 95 % - -   18 1810 - - - - - 16 - - -   18 1750 138/80 - - 65 - 16 95 % - -   18 1740 - - - - - 16 - - -   18 1720 126/72 97.4  F (36.3  C) Oral - 67 19 - - -   18 1700 120/68 - - - 68 10 97 % - -   18 1650 113/73 - - - 72 18 98 % - -   18 1640 106/66 97.1  F (36.2  C) Tympanic - 69 15 97 % - -   18 1630 109/68 95.7  F (35.4  C) - - 77 14 97 % - -   18 1620 106/65 95.9  F (35.5  C) - - 76 14 94 % - -   18 1610 105/62 95.7  F (35.4  C) - - 75 13 96 % - -   18 1600 100/58 95  F (35  C) - - 80 17 100 % - -   18 1220 137/88 - - - 79 16 96 % - -   18 1121 (!) 141/92 96.7  F (35.9  C) - - 83 - 98 % 1.753 m (5' 9\") 113 kg (249 lb 1.9 oz)        Recent Labs   Lab Test  18   0655  " 03/23/18   1128  02/26/18   1407   HGB  12.4  15.1  14.9     Recent Labs   Lab Test  08/19/15   1430  08/19/15   1305  11/18/11   0542   INR  1.05  Unsatisfactory specimen - tube underfilled  1.45*     Recent Labs   Lab Test  03/23/18   1128  08/19/15   1305  08/24/14   0735   PLT  130*  129*  131*     Recent Labs   Lab Test  08/19/15   1305  08/24/14   0735  11/15/11   1040   WBC  5.4  5.6  6.4     Invalid input(s): K      EXAM   The patient is healthy, alert and no distress  Calves are soft and non-tender.   Sensation is intact.  Dorsiflexion and plantar flexion is intact.  Dorsalis pedis pulses intact.   The incision is covered.     Patient Active Problem List   Diagnosis     Hypothyroidism     Hypertension     Major depressive disorder, recurrent episode, moderate (H)     Arthritis of right knee         ASSESSMENT  Stable feeling ok   PLAN  lovenox  PT  Pain control      Bill Tyler M.D  [facility]  [address] [place] [id number]

## 2018-03-25 ENCOUNTER — APPOINTMENT (OUTPATIENT)
Dept: PHYSICAL THERAPY | Facility: CLINIC | Age: 58
DRG: 470 | End: 2018-03-25
Attending: ORTHOPAEDIC SURGERY
Payer: COMMERCIAL

## 2018-03-25 LAB — HGB BLD-MCNC: 11.3 G/DL (ref 11.7–15.7)

## 2018-03-25 PROCEDURE — 25000132 ZZH RX MED GY IP 250 OP 250 PS 637: Performed by: PHYSICIAN ASSISTANT

## 2018-03-25 PROCEDURE — 85018 HEMOGLOBIN: CPT | Performed by: ORTHOPAEDIC SURGERY

## 2018-03-25 PROCEDURE — 97116 GAIT TRAINING THERAPY: CPT | Mod: GP | Performed by: PHYSICAL THERAPIST

## 2018-03-25 PROCEDURE — 25000132 ZZH RX MED GY IP 250 OP 250 PS 637: Performed by: ORTHOPAEDIC SURGERY

## 2018-03-25 PROCEDURE — 25000128 H RX IP 250 OP 636: Performed by: ORTHOPAEDIC SURGERY

## 2018-03-25 PROCEDURE — 40000193 ZZH STATISTIC PT WARD VISIT: Performed by: PHYSICAL THERAPIST

## 2018-03-25 PROCEDURE — 40000894 ZZH STATISTIC OT IP EVAL DEFER: Performed by: OCCUPATIONAL THERAPIST

## 2018-03-25 PROCEDURE — 12000007 ZZH R&B INTERMEDIATE

## 2018-03-25 PROCEDURE — 97110 THERAPEUTIC EXERCISES: CPT | Mod: GP | Performed by: PHYSICAL THERAPIST

## 2018-03-25 RX ORDER — AMOXICILLIN 250 MG
1 CAPSULE ORAL 2 TIMES DAILY PRN
Qty: 100 TABLET | Refills: 0 | Status: SHIPPED | OUTPATIENT
Start: 2018-03-25

## 2018-03-25 RX ORDER — OXYCODONE HYDROCHLORIDE 5 MG/1
5-10 TABLET ORAL EVERY 4 HOURS PRN
Qty: 60 TABLET | Refills: 0 | Status: SHIPPED | OUTPATIENT
Start: 2018-03-25

## 2018-03-25 RX ADMIN — OXYCODONE HYDROCHLORIDE 10 MG: 5 TABLET ORAL at 15:34

## 2018-03-25 RX ADMIN — OXYCODONE HYDROCHLORIDE 10 MG: 5 TABLET ORAL at 12:00

## 2018-03-25 RX ADMIN — OXYCODONE HYDROCHLORIDE 10 MG: 5 TABLET ORAL at 04:07

## 2018-03-25 RX ADMIN — OXYCODONE HYDROCHLORIDE 10 MG: 5 TABLET ORAL at 21:32

## 2018-03-25 RX ADMIN — MELATONIN 3 MG: 3 TAB ORAL at 21:32

## 2018-03-25 RX ADMIN — LEVOTHYROXINE SODIUM 200 MCG: 100 TABLET ORAL at 06:53

## 2018-03-25 RX ADMIN — LISINOPRIL AND HYDROCHLOROTHIAZIDE 1 TABLET: 12.5; 1 TABLET ORAL at 07:56

## 2018-03-25 RX ADMIN — SENNOSIDES AND DOCUSATE SODIUM 2 TABLET: 8.6; 5 TABLET ORAL at 07:55

## 2018-03-25 RX ADMIN — ACETAMINOPHEN 975 MG: 325 TABLET, FILM COATED ORAL at 21:32

## 2018-03-25 RX ADMIN — SERTRALINE HYDROCHLORIDE 150 MG: 100 TABLET ORAL at 07:56

## 2018-03-25 RX ADMIN — ACETAMINOPHEN 975 MG: 325 TABLET, FILM COATED ORAL at 14:11

## 2018-03-25 RX ADMIN — ACETAMINOPHEN 975 MG: 325 TABLET, FILM COATED ORAL at 06:53

## 2018-03-25 RX ADMIN — FERROUS SULFATE TAB 325 MG (65 MG ELEMENTAL FE) 325 MG: 325 (65 FE) TAB at 14:11

## 2018-03-25 RX ADMIN — SENNOSIDES AND DOCUSATE SODIUM 2 TABLET: 8.6; 5 TABLET ORAL at 21:32

## 2018-03-25 RX ADMIN — OXYCODONE HYDROCHLORIDE 10 MG: 5 TABLET ORAL at 07:55

## 2018-03-25 RX ADMIN — ENOXAPARIN SODIUM 40 MG: 40 INJECTION SUBCUTANEOUS at 14:12

## 2018-03-25 NOTE — PLAN OF CARE
Problem: Knee Arthroplasty (Total, Partial) (Adult)  Goal: Signs and Symptoms of Listed Potential Problems Will be Absent, Minimized or Managed (Knee Arthroplasty)  Signs and symptoms of listed potential problems will be absent, minimized or managed by discharge/transition of care (reference Knee Arthroplasty (Total, Partial) (Adult) CPG).   Outcome: Improving  Pt get up with assist of one to the bathroom, takes oxycodone for pain, voids adequately, SL, CMS intact, dressing cdi, continue to monitor.

## 2018-03-25 NOTE — PROGRESS NOTES
"ORTHOPEDIC PROGRESS NOTE LOWER EXTREMITY    PROCEDURE: right knee replacement  POD 2  PAIN: moderate  NAUSEA: absent    Blood pressure 117/62, pulse 62, temperature 98.2  F (36.8  C), temperature source Oral, resp. rate 16, height 1.753 m (5' 9\"), weight 113 kg (249 lb 1.9 oz), SpO2 92 %.    Temp (24hrs), Av  F (36.7  C), Min:97.7  F (36.5  C), Max:98.2  F (36.8  C)    Body mass index is 36.79 kg/(m^2).          Intake/Output Summary (Last 24 hours) at 18 07  Last data filed at 18 1900   Gross per 24 hour   Intake             4500 ml   Output              200 ml   Net             4300 ml       Patient Vitals for the past 48 hrs:   BP Temp Temp src Pulse Heart Rate Resp SpO2 Height Weight   18 0407 117/62 98.2  F (36.8  C) Oral 62 100 16 92 % - -   18 0000 133/71 98.1  F (36.7  C) Oral 89 99 16 98 % - -   18 1918 103/59 97.9  F (36.6  C) Oral - 89 16 93 % - -   18 1744 - - - - - 16 - - -   18 1655 - - - - - 16 - - -   18 1516 107/60 97.8  F (36.6  C) Oral 75 - 18 94 % - -   18 1445 - - - - - 16 - - -   18 1354 - - - - - 16 - - -   18 1233 119/62 98  F (36.7  C) Axillary 82 - 19 95 % - -   18 1115 - - - - - 15 - - -   18 1033 - - - - - 16 - - -   18 0825 - - - - - 16 - - -   18 0744 114/55 97.7  F (36.5  C) Oral 71 - 15 95 % - -   18 0725 - - - - - 13 - - -   03/24/18 0404 114/62 97.8  F (36.6  C) Oral 70 66 19 94 % - -   18 2340 126/61 97.9  F (36.6  C) Oral 80 78 24 91 % - -   18 122/69 - - - 72 25 99 % - -   18 1934 - - - - - 22 - - -   18 1920 129/69 - - 79 - 26 93 % - -   18 1850 126/71 - - 75 - 22 95 % - -   18 1840 - - - - - 16 - - -   18 1820 125/78 - - 70 - 16 95 % - -   18 1810 - - - - - 16 - - -   18 1750 138/80 - - 65 - 16 95 % - -   18 1740 - - - - - 16 - - -   18 1720 126/72 97.4  F (36.3  C) Oral - 67 19 - - -   18 1700 120/68 " "- - - 68 10 97 % - -   03/23/18 1650 113/73 - - - 72 18 98 % - -   03/23/18 1640 106/66 97.1  F (36.2  C) Tympanic - 69 15 97 % - -   03/23/18 1630 109/68 95.7  F (35.4  C) - - 77 14 97 % - -   03/23/18 1620 106/65 95.9  F (35.5  C) - - 76 14 94 % - -   03/23/18 1610 105/62 95.7  F (35.4  C) - - 75 13 96 % - -   03/23/18 1600 100/58 95  F (35  C) - - 80 17 100 % - -   03/23/18 1220 137/88 - - - 79 16 96 % - -   03/23/18 1121 (!) 141/92 96.7  F (35.9  C) - - 83 - 98 % 1.753 m (5' 9\") 113 kg (249 lb 1.9 oz)        Recent Labs   Lab Test  03/25/18   0655  03/24/18   0655  03/23/18   1128   HGB  11.3*  12.4  15.1     Recent Labs   Lab Test  08/19/15   1430  08/19/15   1305  11/18/11   0542   INR  1.05  Unsatisfactory specimen - tube underfilled  1.45*     Recent Labs   Lab Test  03/23/18   1128  08/19/15   1305  08/24/14   0735   PLT  130*  129*  131*     Recent Labs   Lab Test  08/19/15   1305  08/24/14   0735  11/15/11   1040   WBC  5.4  5.6  6.4     Invalid input(s): K      EXAM   The patient is healthy, alert and no distress  Calves are soft and non-tender.   Sensation is intact.  Dorsiflexion and plantar flexion is intact.  Dorsalis pedis pulses intact.   The incision is dry and intact.     Patient Active Problem List   Diagnosis     Hypothyroidism     Hypertension     Major depressive disorder, recurrent episode, moderate (H)     Arthritis of right knee         ASSESSMENT  Doing well   PLAN  Plans to go home tomorrow  I told her to go to the PT that she wants to and if there is problems call my office and we can fax an order      Bill Tyler M.D  [facility]  [address] [place] [id number]      "

## 2018-03-25 NOTE — PLAN OF CARE
Problem: Patient Care Overview  Goal: Plan of Care/Patient Progress Review  Outcome: Improving  VSS, CMS intact. Up with 1 and walker. Pain well controlled with oxycodone and tylenol. Dressing changed, incision looked good. Alert and orient x 4. Discharge home tomorrow.

## 2018-03-25 NOTE — PROGRESS NOTES
03/24/18 1009   Quick Adds   Type of Visit Initial PT Evaluation   Living Environment   Lives With child(keli), adult;grandchild(keli)   Living Arrangements house  (1 story TH on slab)   Home Accessibility no concerns   Number of Stairs to Enter Home 0   Number of Stairs Within Home 0   Transportation Available car;family or friend will provide   Self-Care   Dominant Hand right   Usual Activity Tolerance good   Current Activity Tolerance poor   Regular Exercise no   Equipment Currently Used at Home walker, rolling;cane, straight   Functional Level Prior   Ambulation 1-->assistive equipment   Transferring 1-->assistive equipment   Toileting 0-->independent   Bathing 0-->independent   Dressing 0-->independent   Eating 0-->independent   Communication 0-->understands/communicates without difficulty   Swallowing 0-->swallows foods/liquids without difficulty   Cognition 0 - no cognition issues reported   Fall history within last six months no   Which of the above functional risks had a recent onset or change? ambulation   General Information   Onset of Illness/Injury or Date of Surgery - Date 03/23/18   Referring Physician Bill Tyler   Patient/Family Goals Statement Disch to home with help of family and OPPT   Pertinent History of Current Problem (include personal factors and/or comorbidities that impact the POC) 57 year old female with PMHx of osteoarthritis, essential hypertension, depression, and hypothryoidism who is s/p right total knee arthroplasty for end stage arthritis of the right knee. Surgery performed by Dr. Tyler.   Precautions/Limitations fall precautions;other (see comments)  (Knee immobilizer at noc and until (I) SLR.)   Weight-Bearing Status - RLE weight-bearing as tolerated   Cognitive Status Examination   Orientation orientation to person, place and time   Level of Consciousness alert   Follows Commands and Answers Questions 100% of the time;able to follow multistep instructions   Personal Safety  and Judgment intact   Memory intact   Pain Assessment   Patient Currently in Pain Yes, see Vital Sign flowsheet  (8/10 R knee pain.)   Integumentary/Edema   Integumentary/Edema Comments Surgical site covered by postop dressing.   Posture    Posture Not impaired   Range of Motion (ROM)   ROM Comment R knee ROM: 8-53   Strength   Strength Comments R L/E strength inhibited by postop pain and edema.   Bed Mobility   Bed Mobility Bed mobility analysis   Bed Mobility Comments Needs Leah and vc for bed mobility, supine to sit at EOB.    Bed Mobility Analysis   Bed Mobility Limitations decreased ability to use legs for bridging/pushing   Impairments Contributing to Impaired Bed Mobility decreased flexibility;pain;decreased ROM;decreased strength   Transfer Skills   Transfer Comments Needs Leah and vc for transfers, sit to stand and back to sit, WBAT R with FWW.   Gait   Gait Gait Analysis   Gait Comments Needs Leah and vc for gait with FWW, WBAT R, 50', followed by w/c.   Gait Analysis   Gait Pattern Used 3-point gait   Gait Deviations Noted decreased spenser;decreased velocity of limb motion;decreased stride length   Impairments Contributing to Gait Deviations decreased flexibility;pain;decreased ROM;decreased strength   Balance   Balance no deficits were identified   Sensory Examination   Sensory Perception no deficits were identified   Coordination   Coordination no deficits were identified   Muscle Tone   Muscle Tone no deficits were identified   Modality Interventions   Planned Modality Interventions Cryotherapy   General Therapy Interventions   Planned Therapy Interventions bed mobility training;gait training;ROM;strengthening;stretching;transfer training;risk factor education;home program guidelines;progressive activity/exercise   Clinical Impression   Criteria for Skilled Therapeutic Intervention yes, treatment indicated   PT Diagnosis Impaired mobility and gait.   Influenced by the following impairments Pain,  "edema, and weakness.   Functional limitations due to impairments Limited mobility and gait.   Clinical Presentation Stable/Uncomplicated   Clinical Presentation Rationale Functional assessment and cllinical judgement.   Clinical Decision Making (Complexity) Low complexity   Therapy Frequency` 2 times/day   Predicted Duration of Therapy Intervention (days/wks) 3 days.   Anticipated Equipment Needs at Discharge front wheeled walker   Anticipated Discharge Disposition Home with Assist;Home with Outpatient Therapy   Risk & Benefits of therapy have been explained Yes   Patient, Family & other staff in agreement with plan of care Yes   [facility] TM \"6 Clicks\"   2016, Trustees of [facility], under license to [employer]  All rights reserved.   6 Clicks Short Forms Basic Mobility Inpatient Short Form   [facility]  \"6 Clicks\" V.2 Basic Mobility Inpatient Short Form   1. Turning from your back to your side while in a flat bed without using bedrails? 3 - A Little   2. Moving from lying on your back to sitting on the side of a flat bed without using bedrails? 3 - A Little   3. Moving to and from a bed to a chair (including a wheelchair)? 3 - A Little   4. Standing up from a chair using your arms (e.g., wheelchair, or bedside chair)? 3 - A Little   5. To walk in hospital room? 3 - A Little   6. Climbing 3-5 steps with a railing? 2 - A Lot   Basic Mobility Raw Score (Score out of 24.Lower scores equate to lower levels of function) 17   Total Evaluation Time   Total Evaluation Time (Minutes) 15     "

## 2018-03-25 NOTE — PLAN OF CARE
Problem: Patient Care Overview  Goal: Plan of Care/Patient Progress Review  Orders received, chart reviewed, Eval completed, and treatment started, s/p R TKA on 3/23/18.  Discharge Planner PT   Patient plan for discharge: Disch to home with help of her daughter and OPPT.  Current status: PT: Needs Leah and vc for exercise, bed mobility, transfers, and gait with FWW, 50', WBAT R.  Barriers to return to prior living situation: Postop pain, edema, and weakness.  Recommendations for discharge: Disch to home with help of her daughter and OPPT.  Rationale for recommendations: Patient will continue to need skilled PT for recovery of functional independence, mobility, and safety for negotiation of chosen residence.       Entered by: Ulises Mccann 03/24/2018 10:05 PM

## 2018-03-25 NOTE — PLAN OF CARE
Problem: Patient Care Overview  Goal: Plan of Care/Patient Progress Review  Discharge Planner OT   Patient plan for discharge: Home with assist from her daughters    Current status: Screened pt for OT services, pt completed transfers and ambulation as well as LE dressing with SBA, pt has been using a modified technique to enter tub/shower, and verbalized appropriate plan, she will have assist from family for initial transfers. Pt has no identified OT needs.   Barriers to return to prior living situation: See PT note  Recommendations for discharge: Home with assist from family for ADLs and functional mobility as needed  Rationale for recommendations: Pt has no identified OT needs at this time. Screen completed. Orders completed       Entered by: Shira Mclean 03/25/2018 1:04 PM

## 2018-03-26 ENCOUNTER — APPOINTMENT (OUTPATIENT)
Dept: PHYSICAL THERAPY | Facility: CLINIC | Age: 58
DRG: 470 | End: 2018-03-26
Attending: ORTHOPAEDIC SURGERY
Payer: COMMERCIAL

## 2018-03-26 VITALS
HEIGHT: 69 IN | WEIGHT: 249.12 LBS | SYSTOLIC BLOOD PRESSURE: 132 MMHG | OXYGEN SATURATION: 96 % | BODY MASS INDEX: 36.9 KG/M2 | HEART RATE: 94 BPM | RESPIRATION RATE: 16 BRPM | TEMPERATURE: 98.6 F | DIASTOLIC BLOOD PRESSURE: 72 MMHG

## 2018-03-26 LAB
CREAT SERPL-MCNC: 0.81 MG/DL (ref 0.52–1.04)
GFR SERPL CREATININE-BSD FRML MDRD: 73 ML/MIN/1.7M2
PLATELET # BLD AUTO: 91 10E9/L (ref 150–450)

## 2018-03-26 PROCEDURE — 97110 THERAPEUTIC EXERCISES: CPT | Mod: GP | Performed by: PHYSICAL THERAPY ASSISTANT

## 2018-03-26 PROCEDURE — 36415 COLL VENOUS BLD VENIPUNCTURE: CPT | Performed by: ORTHOPAEDIC SURGERY

## 2018-03-26 PROCEDURE — 97116 GAIT TRAINING THERAPY: CPT | Mod: GP | Performed by: PHYSICAL THERAPY ASSISTANT

## 2018-03-26 PROCEDURE — 25000132 ZZH RX MED GY IP 250 OP 250 PS 637: Performed by: ORTHOPAEDIC SURGERY

## 2018-03-26 PROCEDURE — 85049 AUTOMATED PLATELET COUNT: CPT | Performed by: ORTHOPAEDIC SURGERY

## 2018-03-26 PROCEDURE — 25000132 ZZH RX MED GY IP 250 OP 250 PS 637: Performed by: PHYSICIAN ASSISTANT

## 2018-03-26 PROCEDURE — 82565 ASSAY OF CREATININE: CPT | Performed by: ORTHOPAEDIC SURGERY

## 2018-03-26 PROCEDURE — 40000193 ZZH STATISTIC PT WARD VISIT: Performed by: PHYSICAL THERAPY ASSISTANT

## 2018-03-26 RX ADMIN — LISINOPRIL AND HYDROCHLOROTHIAZIDE 1 TABLET: 12.5; 1 TABLET ORAL at 08:09

## 2018-03-26 RX ADMIN — ACETAMINOPHEN 975 MG: 325 TABLET, FILM COATED ORAL at 07:51

## 2018-03-26 RX ADMIN — FERROUS SULFATE TAB 325 MG (65 MG ELEMENTAL FE) 325 MG: 325 (65 FE) TAB at 12:06

## 2018-03-26 RX ADMIN — LEVOTHYROXINE SODIUM 200 MCG: 100 TABLET ORAL at 07:51

## 2018-03-26 RX ADMIN — OXYCODONE HYDROCHLORIDE 10 MG: 5 TABLET ORAL at 08:08

## 2018-03-26 RX ADMIN — OXYCODONE HYDROCHLORIDE 10 MG: 5 TABLET ORAL at 12:06

## 2018-03-26 RX ADMIN — SENNOSIDES AND DOCUSATE SODIUM 2 TABLET: 8.6; 5 TABLET ORAL at 08:09

## 2018-03-26 RX ADMIN — SERTRALINE HYDROCHLORIDE 150 MG: 100 TABLET ORAL at 08:09

## 2018-03-26 RX ADMIN — OXYCODONE HYDROCHLORIDE 10 MG: 5 TABLET ORAL at 03:47

## 2018-03-26 NOTE — DISCHARGE SUMMARY
[facility] Discharge Summary    Elizabeth Blanco MRN# [id number]   Age: 57 year old YOB: 1960     Date of Admission:  3/23/2018  Date of Discharge:  3/26/2018  Admitting Provider:  Bill Tyler MD  Discharge Provider:  Cr Wang PA-C  [facility]      Primary Provider: Onelia Zacarias  Primary Care Physician Phone Number: [phone]          Admission Diagnoses:   Principle Diagnosis: END STAGE ARTHRITIS RIGHT KNEE   Arthritis of right knee  Secondary Diagnoses:          Discharge Diagnosis:   END STAGE ARTHRITIS RIGHT KNEE           Procedures:   Procedure(s): total knee arthroplasty, right sided             Discharge Medications:     Current Discharge Medication List      START taking these medications    Details   oxyCODONE IR (ROXICODONE) 5 MG tablet Take 1-2 tablets (5-10 mg) by mouth every 4 hours as needed for moderate to severe pain  Qty: 60 tablet, Refills: 0    Associated Diagnoses: Arthritis of right knee      senna-docusate (SENOKOT-S;PERICOLACE) 8.6-50 MG per tablet Take 1 tablet by mouth 2 times daily as needed for constipation  Qty: 100 tablet, Refills: 0    Associated Diagnoses: Arthritis of right knee         CONTINUE these medications which have NOT CHANGED    Details   fluticasone (FLONASE) 50 MCG/ACT spray Spray 2 sprays into both nostrils daily as needed for rhinitis or allergies      ferrous sulfate (IRON) 325 (65 FE) MG tablet Take 325 mg by mouth daily      aspirin-acetaminophen-caffeine (EXCEDRIN EXTRA STRENGTH) 250-250-65 MG per tablet Take 2 tablets by mouth 2 times daily as needed for headaches      Cyclobenzaprine HCl (FLEXERIL PO) Take 5 mg by mouth daily as needed for muscle spasms      MELATONIN GUMMIES PO Take 0.5 chew tab by mouth At Bedtime      LEVOTHYROXINE SODIUM PO Take 200 mcg by mouth daily      SERTRALINE HCL PO Take 150 mg by mouth daily (Takes 1.5 x 100mg tablet = 150mg dose)     "  lisinopril-hydrochlorothiazide (PRINZIDE,ZESTORETIC) 10-12.5 MG per tablet Take 1 tablet by mouth daily  Qty: 30 tablet, Refills: 0    Comments: Generic equivalent is ok  Associated Diagnoses: Hypertension         STOP taking these medications       mupirocin (BACTROBAN) 2 % nasal ointment Comments:   Reason for Stopping:                   Allergies:         Allergies   Allergen Reactions     Codeine Sulfate Other (See Comments)     headache     Penicillins Rash     Sulfa Drugs Rash             Brief History of Illness:   Elizabeth Blanco presented with end stage osteoarthritis of the right knee joint.     After discussing the risks, benefits, and possible complications, informed consent was obtained and the patient underwent the above procedure.  There were no complications.  Please see the Operative Report for full details.           Hospital Course:   Elizabeth Blanco's hospital course was unremarkable.  She recovered as anticipated and experienced no post-operative complications.     On the date of discharge, the patient was discharged to home in stable condition and afebrile.  She verbalized understanding of all discharge instructions and felt comfortable with the discharge plan.  She was asked to call with any further questions or concerns.         Condition on Discharge:      Discharge condition: Stable   Discharge vitals: Blood pressure 120/74, pulse 85, temperature 98.5  F (36.9  C), temperature source Oral, resp. rate 16, height 1.753 m (5' 9\"), weight 113 kg (249 lb 1.9 oz), SpO2 96 %.           Discharge Disposition:   Discharged to home          Discharge Instructions and Follow-Up:      Elizabeth Blanco was asked to follow up with Dr. Tyler in 2 weeks from discharge from the hospital for routine, post-operative visit. Please call [facility] at [phone] to schedule this appointment.       Cr Wang PA-C   [facility]  [phone]         "

## 2018-03-26 NOTE — PLAN OF CARE
Problem: Patient Care Overview  Goal: Plan of Care/Patient Progress Review  Discharge Planner PT   Patient plan for discharge: Home and OP PT  Current status: Pt is CGA-SBA for sit to stand. Pt required Leah to assist w/R LE with bed mobility.  Pt amb 10' x2 and 80' w/FWW and CGA. 1 trial of ascend/descend 1 set of 3 stairs w/SEC on L.  Issued TKA HEP.  Pt demonstrated understanding of ex and frequency.   Barriers to return to prior living situation: None  Recommendations for discharge: Home with assist from family and OP PT per PT POC  Rationale for recommendations: Pt would continue to progress towards functional independence with skilled physical therapy.     Pt is going home, someone at home can assist, OP PT, goals partially met         Entered by: Vibha Espinosa 03/26/2018 9:28 AM       Physical Therapy Discharge Summary    Reason for therapy discharge:    Discharged to home with outpatient therapy.    Progress towards therapy goal(s). See goals on Care Plan in [facility] electronic health record for goal details.  Goals partially met.  Barriers to achieving goals:   discharge from facility.    Therapy recommendation(s):    Continued therapy is recommended.  Rationale/Recommendations:  OPPT to progress mobility.

## 2018-03-26 NOTE — PLAN OF CARE
Problem: Knee Arthroplasty (Total, Partial) (Adult)  Goal: Signs and Symptoms of Listed Potential Problems Will be Absent, Minimized or Managed (Knee Arthroplasty)  Signs and symptoms of listed potential problems will be absent, minimized or managed by discharge/transition of care (reference Knee Arthroplasty (Total, Partial) (Adult) CPG).   Outcome: Improving  Pt will be discharge home today, up with 1 assist to bathroom, IV saline lock,

## 2018-03-26 NOTE — PLAN OF CARE
Problem: Patient Care Overview  Goal: Plan of Care/Patient Progress Review  Outcome: Improving  Patient A&Ox4, VSS On RA. Up with 1 & walker . CMS intact. Pain managed with oxycodone. Pt progressing per plan of care. Plan d/c home tomorrow.

## 2018-03-26 NOTE — PLAN OF CARE
Problem: Knee Arthroplasty (Total, Partial) (Adult)  Goal: Signs and Symptoms of Listed Potential Problems Will be Absent, Minimized or Managed (Knee Arthroplasty)  Signs and symptoms of listed potential problems will be absent, minimized or managed by discharge/transition of care (reference Knee Arthroplasty (Total, Partial) (Adult) CPG).   Outcome: Adequate for Discharge Date Met: 03/26/18  Pt A/Ox4. VSS. Up 1 assist w/ walker. Reports pain 3/10 using oxycodone with relief. Regular diet tolerated. CMS intact. Dressing changed, aquacel c/d/i. Voiding adequately. D/c to home at 1315 via wheelchair with daughter. AVS and cost of medication reviewed with pt at d/c. Denies pain at d/c.

## 2018-03-26 NOTE — PROGRESS NOTES
"ORTHOPEDIC PROGRESS NOTE LOWER EXTREMITY    PROCEDURE: right knee replacement  POD 3  PAIN: minimal  NAUSEA: absent    Blood pressure 131/72, pulse 85, temperature 98.5  F (36.9  C), temperature source Oral, resp. rate 16, height 1.753 m (5' 9\"), weight 113 kg (249 lb 1.9 oz), SpO2 96 %.  EXAM   The patient is healthy, alert and no distress  Calves are soft and non-tender.   Sensation is intact.  Dorsiflexion and plantar flexion is intact.  Dorsalis pedis pulses intact.   The incision is covered.     ASSESSMENT  S/p right sided total knee arthroplasty - doing well; no new concerns.     PLAN  Discharge home today   Please apply Aquacel dressing over surgical wound prior to discharge.   Pain well controlled; continue with oxycodone and Tylenol   WBAT; Activity as tolerated; encourage mobility   Lovenox, PCD, and ambulation for DVT prophylaxis - aspirin on d/c   Hgb stable: continue to monitor   Normal diet; advance as tolerated and encourage oral fluid intake   Encourage incentive spirometry     Patient Active Problem List   Diagnosis     Hypothyroidism     Hypertension     Major depressive disorder, recurrent episode, moderate (H)     Arthritis of right knee         Temp (24hrs), Av.1  F (36.7  C), Min:97.5  F (36.4  C), Max:98.5  F (36.9  C)    Body mass index is 36.79 kg/(m^2).      Intake/Output Summary (Last 24 hours) at 18 0626  Last data filed at 18 1628   Gross per 24 hour   Intake              250 ml   Output                0 ml   Net              250 ml        Recent Labs   Lab Test  18   0655  18   0655  18   1128   HGB  11.3*  12.4  15.1     Recent Labs   Lab Test  08/19/15   1430  08/19/15   1305  11   0542   INR  1.05  Unsatisfactory specimen - tube underfilled  1.45*     Recent Labs   Lab Test  18   1128  08/19/15   1305  14   0735   PLT  130*  129*  131*     Recent Labs   Lab Test  08/19/15   1305  14   0735  11/15/11   1040   WBC  5.4  5.6  6.4 "     Invalid input(s): LUCIA Wang PA-C   [facility]  [phone]

## 2018-03-26 NOTE — PLAN OF CARE
Problem: Patient Care Overview  Goal: Plan of Care/Patient Progress Review  Discharge Planner PT   Patient plan for discharge: Disch to home with help of her family and OPPT.  Current status: PT: Needs CGA and vc for exercise, bed mobility, transfers, and gait training, WBAT R with FWW, 250'.  R knee ROM: 7-60 degrees.  Barriers to return to prior living situation: Postop pain, edema, and weakness.  Recommendations for discharge: Disch to home with help of her family and OPPT.  Rationale for recommendations: Will continue to need skilled PT for recovery of functional independence, mobility, and safety for negotiation of chosen residence.       Entered by: Ulises Mccann 03/25/2018 10:05 PM

## 2023-08-21 NOTE — PLAN OF CARE
Problem: Knee Arthroplasty (Total, Partial) (Adult)  Goal: Signs and Symptoms of Listed Potential Problems Will be Absent, Minimized or Managed (Knee Arthroplasty)  Signs and symptoms of listed potential problems will be absent, minimized or managed by discharge/transition of care (reference Knee Arthroplasty (Total, Partial) (Adult) CPG).   Outcome: Improving  A&Ox4, POD 1. CMS intact, dressing C/D/I. VSS on RA. Up A1 with walker. Eating, drinking, and voiding adequately. BS for 61 this afternoon. Fluids encouraged. IV NS @ 125 ml/hr. Pain managed with PRN oxy and ice.        Yes

## 2024-04-19 ENCOUNTER — HOSPITAL ENCOUNTER (EMERGENCY)
Facility: CLINIC | Age: 64
Discharge: HOME | End: 2024-04-19
Attending: EMERGENCY MEDICINE | Admitting: EMERGENCY MEDICINE
Payer: COMMERCIAL

## 2024-04-19 VITALS
HEIGHT: 69 IN | HEART RATE: 73 BPM | RESPIRATION RATE: 20 BRPM | BODY MASS INDEX: 34.8 KG/M2 | WEIGHT: 235 LBS | TEMPERATURE: 98 F | DIASTOLIC BLOOD PRESSURE: 86 MMHG | SYSTOLIC BLOOD PRESSURE: 146 MMHG | OXYGEN SATURATION: 94 %

## 2024-04-19 DIAGNOSIS — M54.6 ACUTE LEFT-SIDED THORACIC BACK PAIN: ICD-10-CM

## 2024-04-19 LAB
ALBUMIN SERPL BCG-MCNC: 3.8 G/DL (ref 3.5–5.2)
ALP SERPL-CCNC: 128 U/L (ref 40–150)
ALT SERPL W P-5'-P-CCNC: 111 U/L (ref 0–50)
ANION GAP SERPL CALCULATED.3IONS-SCNC: 13 MMOL/L (ref 7–15)
AST SERPL W P-5'-P-CCNC: 52 U/L (ref 0–45)
ATRIAL RATE - MUSE: 73 BPM
BASOPHILS # BLD AUTO: 0 10E3/UL (ref 0–0.2)
BASOPHILS NFR BLD AUTO: 0 %
BILIRUB SERPL-MCNC: 0.8 MG/DL
BUN SERPL-MCNC: 18.4 MG/DL (ref 8–23)
CALCIUM SERPL-MCNC: 8.8 MG/DL (ref 8.8–10.2)
CHLORIDE SERPL-SCNC: 98 MMOL/L (ref 98–107)
CREAT SERPL-MCNC: 0.72 MG/DL (ref 0.51–0.95)
DEPRECATED HCO3 PLAS-SCNC: 20 MMOL/L (ref 22–29)
DIASTOLIC BLOOD PRESSURE - MUSE: NORMAL MMHG
EGFRCR SERPLBLD CKD-EPI 2021: >90 ML/MIN/1.73M2
EOSINOPHIL # BLD AUTO: 0 10E3/UL (ref 0–0.7)
EOSINOPHIL NFR BLD AUTO: 0 %
ERYTHROCYTE [DISTWIDTH] IN BLOOD BY AUTOMATED COUNT: 13.4 % (ref 10–15)
GLUCOSE SERPL-MCNC: 144 MG/DL (ref 70–99)
HCT VFR BLD AUTO: 39.2 % (ref 35–47)
HGB BLD-MCNC: 13.6 G/DL (ref 11.7–15.7)
IMM GRANULOCYTES # BLD: 0 10E3/UL
IMM GRANULOCYTES NFR BLD: 1 %
INTERPRETATION ECG - MUSE: NORMAL
LYMPHOCYTES # BLD AUTO: 1.3 10E3/UL (ref 0.8–5.3)
LYMPHOCYTES NFR BLD AUTO: 18 %
MCH RBC QN AUTO: 27.3 PG (ref 26.5–33)
MCHC RBC AUTO-ENTMCNC: 34.7 G/DL (ref 31.5–36.5)
MCV RBC AUTO: 79 FL (ref 78–100)
MONOCYTES # BLD AUTO: 0.7 10E3/UL (ref 0–1.3)
MONOCYTES NFR BLD AUTO: 10 %
NEUTROPHILS # BLD AUTO: 4.9 10E3/UL (ref 1.6–8.3)
NEUTROPHILS NFR BLD AUTO: 71 %
NRBC # BLD AUTO: 0 10E3/UL
NRBC BLD AUTO-RTO: 0 /100
P AXIS - MUSE: 35 DEGREES
PLATELET # BLD AUTO: 124 10E3/UL (ref 150–450)
POTASSIUM SERPL-SCNC: 3.4 MMOL/L (ref 3.4–5.3)
PR INTERVAL - MUSE: 170 MS
PROT SERPL-MCNC: 7.8 G/DL (ref 6.4–8.3)
QRS DURATION - MUSE: 88 MS
QT - MUSE: 398 MS
QTC - MUSE: 438 MS
R AXIS - MUSE: -23 DEGREES
RBC # BLD AUTO: 4.98 10E6/UL (ref 3.8–5.2)
SODIUM SERPL-SCNC: 131 MMOL/L (ref 135–145)
SYSTOLIC BLOOD PRESSURE - MUSE: NORMAL MMHG
T AXIS - MUSE: 15 DEGREES
TROPONIN T SERPL HS-MCNC: 8 NG/L
VENTRICULAR RATE- MUSE: 73 BPM
WBC # BLD AUTO: 6.9 10E3/UL (ref 4–11)

## 2024-04-19 PROCEDURE — 36415 COLL VENOUS BLD VENIPUNCTURE: CPT | Performed by: EMERGENCY MEDICINE

## 2024-04-19 PROCEDURE — 85025 COMPLETE CBC W/AUTO DIFF WBC: CPT | Performed by: EMERGENCY MEDICINE

## 2024-04-19 PROCEDURE — 82247 BILIRUBIN TOTAL: CPT | Performed by: EMERGENCY MEDICINE

## 2024-04-19 PROCEDURE — 93005 ELECTROCARDIOGRAM TRACING: CPT

## 2024-04-19 PROCEDURE — 96375 TX/PRO/DX INJ NEW DRUG ADDON: CPT

## 2024-04-19 PROCEDURE — 96374 THER/PROPH/DIAG INJ IV PUSH: CPT

## 2024-04-19 PROCEDURE — 84484 ASSAY OF TROPONIN QUANT: CPT | Performed by: EMERGENCY MEDICINE

## 2024-04-19 PROCEDURE — 250N000011 HC RX IP 250 OP 636: Performed by: EMERGENCY MEDICINE

## 2024-04-19 PROCEDURE — 250N000013 HC RX MED GY IP 250 OP 250 PS 637: Performed by: EMERGENCY MEDICINE

## 2024-04-19 PROCEDURE — 99284 EMERGENCY DEPT VISIT MOD MDM: CPT | Mod: 25

## 2024-04-19 RX ORDER — KETOROLAC TROMETHAMINE 15 MG/ML
15 INJECTION, SOLUTION INTRAMUSCULAR; INTRAVENOUS ONCE
Status: COMPLETED | OUTPATIENT
Start: 2024-04-19 | End: 2024-04-19

## 2024-04-19 RX ORDER — GABAPENTIN 100 MG/1
100 CAPSULE ORAL 3 TIMES DAILY
Qty: 30 CAPSULE | Refills: 0 | Status: SHIPPED | OUTPATIENT
Start: 2024-04-19 | End: 2024-04-29

## 2024-04-19 RX ORDER — LIDOCAINE 4 G/G
1 PATCH TOPICAL ONCE
Status: DISCONTINUED | OUTPATIENT
Start: 2024-04-19 | End: 2024-04-19 | Stop reason: HOSPADM

## 2024-04-19 RX ORDER — METHYLPREDNISOLONE 4 MG
TABLET, DOSE PACK ORAL
Qty: 21 TABLET | Refills: 0 | Status: SHIPPED | OUTPATIENT
Start: 2024-04-19

## 2024-04-19 RX ADMIN — HYDROMORPHONE HYDROCHLORIDE 1 MG: 1 INJECTION, SOLUTION INTRAMUSCULAR; INTRAVENOUS; SUBCUTANEOUS at 12:45

## 2024-04-19 RX ADMIN — LIDOCAINE 1 PATCH: 4 PATCH TOPICAL at 12:48

## 2024-04-19 RX ADMIN — KETOROLAC TROMETHAMINE 15 MG: 15 INJECTION, SOLUTION INTRAMUSCULAR; INTRAVENOUS at 12:46

## 2024-04-19 ASSESSMENT — ACTIVITIES OF DAILY LIVING (ADL)
ADLS_ACUITY_SCORE: 41

## 2024-04-19 ASSESSMENT — COLUMBIA-SUICIDE SEVERITY RATING SCALE - C-SSRS
6. HAVE YOU EVER DONE ANYTHING, STARTED TO DO ANYTHING, OR PREPARED TO DO ANYTHING TO END YOUR LIFE?: NO
1. IN THE PAST MONTH, HAVE YOU WISHED YOU WERE DEAD OR WISHED YOU COULD GO TO SLEEP AND NOT WAKE UP?: NO
2. HAVE YOU ACTUALLY HAD ANY THOUGHTS OF KILLING YOURSELF IN THE PAST MONTH?: NO

## 2024-04-19 NOTE — DISCHARGE INSTRUCTIONS
Please follow-up with orthopedics and your primary care physician.  Please follow-up with primary care physician or orthopedics for discussion of titrating, or increasing your daily dose of Neurontin or gabapentin.  Please follow their instructions.      Discharge Instructions  Back Pain  You were seen today for back pain. Back pain can have many causes, but most will get better without surgery or other specific treatment. Sometimes there is a herniated ( slipped ) disc. We do not usually do MRI scans to look for these right away, since most herniated discs will get better on their own with time.  Today, we did not find any evidence that your back pain was caused by a serious condition. However, sometimes symptoms develop over time and cannot be found during an emergency visit, so it is very important that you follow up with your primary provider.  Generally, every Emergency Department visit should have a follow-up clinic visit with either a primary or a specialty clinic/provider. Please follow-up as instructed by your emergency provider today.    Return to the Emergency Department if:  You develop a fever with your back pain.   You have weakness or change in sensation in one or both legs.  You lose control of your bowels or bladder, or cannot empty your bladder (cannot pee).  Your pain gets much worse.     Follow-up with your provider:  Unless your pain has completely gone away, please make an appointment with your provider within one week. Most of the routine care for back pain is available in a clinic and not the Emergency Department. You may need further management of your back pain, such as more pain medication, imaging such as an X-ray or MRI, or physical therapy.    What can I do to help myself?  Remain Active -- People are often afraid that they will hurt their back further or delay recovery by remaining active, but this is one of the best things you can do for your back. In fact, staying in bed for a long  time to rest is not recommended. Studies have shown that people with low back pain recover faster when they remain active. Movement helps to bring blood flow to the muscles and relieve muscle spasms as well as preventing loss of muscle strength.  Heat -- Using a heating pad can help with low back pain during the first few weeks. Do not sleep with a heating pad, as you can be burned.   Pain medications - You may take a pain medication such as Tylenol  (acetaminophen), Advil , Motrin  (ibuprofen) or Aleve  (naproxen).  If you were given a prescription for medicine here today, be sure to read all of the information (including the package insert) that comes with your prescription.  This will include important information about the medicine, its side effects, and any warnings that you need to know about.  The pharmacist who fills the prescription can provide more information and answer questions you may have about the medicine.  If you have questions or concerns that the pharmacist cannot address, please call or return to the Emergency Department.   Remember that you can always come back to the Emergency Department if you are not able to see your regular provider in the amount of time listed above, if you get any new symptoms, or if there is anything that worries you.

## 2024-04-19 NOTE — ED PROVIDER NOTES
"  History     Chief Complaint:  left shoulder pain       The history is provided by the patient.      Elizabeth Blanco is a 63 year old female with history of hypertension who presents with left shoulder pain for the past 5 days. Patient reports waking up with the pain. She describes the pain as \"shooting out of [her] shoulder\". She has been to the ED twice. Had a MRI but for unrelated symptoms. Denies neck pain, chest pain, numbness in legs or urinary symptoms. No history of blood clots. No recent surgeries. She has been taking ibuprofen and Tylenol for the pain and was prescribed Oxycodone but states they have not helped with the pain. She had ortho appointment today but was unable to make it. Patient states she sees chiropractor every two months.      Independent Historian:   None - Patient Only    Review of External Notes:   ED visit 4/14/24  ED Visit 4/15/24  4/17 ortho visit      Medications:    Flexeril   Flonase  Levothyroxine   Oxycodone   Senna-docusate   Prinzide       Past Medical History:    Anemia  Arthritis  Degenerative joint disease  Depression  Gastro-oesophageal reflux disease  Suicide attempt  Hypertension  Impaired fasting glucose  Migraine  Thrombocytopenia   Thyroid disease  Trochanteric bursitis  Varicose veins of legs      Past Surgical History:    Arthroplasty knee x2  Breast biopsy   Colonoscopy   Hysterectomy   Vascular surgery        Physical Exam   Patient Vitals for the past 24 hrs:   BP Temp Temp src Pulse Resp SpO2 Height Weight   04/19/24 1100 (!) 170/86 -- -- 74 -- 98 % -- --   04/19/24 1021 (!) 152/90 -- -- 74 -- 99 % -- --   04/19/24 1018 (!) 152/90 98  F (36.7  C) Oral 72 20 100 % 1.753 m (5' 9\") 106.6 kg (235 lb)        Physical Exam  Constitutional: Well appearing.  HEENT: Atraumatic.  PERRL.  EOMI.  Moist mucous membranes.  Neck: Soft.  Supple.  No JVD.  Cardiac: Regular rate and rhythm.  No murmur or rub.  Respiratory: Clear to auscultation bilaterally.  No respiratory " distress.  No wheezing, rhonchi, or rales.  Abdomen: Soft and nontender.  No rebound or guarding.  Nondistended.  Musculoskeletal: Mildly reproducible tenderness over the left rhomboid paraspinal area just medial to the scapula. No erythema or rash. No effusion of tenderness of the shoulder or neck. Left radial pulse is 2+. Capillary refill less than 3 seconds throughout left hand. No edema.  Normal range of motion.  Neurologic: Alert and oriented x3.  Normal tone and bulk.   5/5 strength in bilateral upper and lower extremities.  Sensation to light touch intact throughout.  Normal gait.  Skin: No rashes.  No edema.  Psych: Normal affect.  Normal behavior.            Emergency Department Course   ECG  ECG taken at 1047, ECG read at 1058  Normal sinus rhythm    Normal ECG    No changes  as compared to prior, dated 3/22/2018.  Rate 73 bpm. FL interval 170 ms. QRS duration 88 ms. QT/QTc 398/438 ms. P-R-T axes 35 -23 15.     Imaging:  No orders to display          Laboratory:  Labs Ordered and Resulted from Time of ED Arrival to Time of ED Departure   COMPREHENSIVE METABOLIC PANEL - Abnormal       Result Value    Sodium 131 (*)     Potassium 3.4      Carbon Dioxide (CO2) 20 (*)     Anion Gap 13      Urea Nitrogen 18.4      Creatinine 0.72      GFR Estimate >90      Calcium 8.8      Chloride 98      Glucose 144 (*)     Alkaline Phosphatase 128      AST 52 (*)      (*)     Protein Total 7.8      Albumin 3.8      Bilirubin Total 0.8     CBC WITH PLATELETS AND DIFFERENTIAL - Abnormal    WBC Count 6.9      RBC Count 4.98      Hemoglobin 13.6      Hematocrit 39.2      MCV 79      MCH 27.3      MCHC 34.7      RDW 13.4      Platelet Count 124 (*)     % Neutrophils 71      % Lymphocytes 18      % Monocytes 10      % Eosinophils 0      % Basophils 0      % Immature Granulocytes 1      NRBCs per 100 WBC 0      Absolute Neutrophils 4.9      Absolute Lymphocytes 1.3      Absolute Monocytes 0.7      Absolute Eosinophils 0.0       Absolute Basophils 0.0      Absolute Immature Granulocytes 0.0      Absolute NRBCs 0.0     TROPONIN T, HIGH SENSITIVITY - Normal    Troponin T, High Sensitivity 8          Procedures       Emergency Department Course & Assessments:    Interventions:  Medications   HYDROmorphone (DILAUDID) injection 1 mg (1 mg Intravenous $Given 4/19/24 1245)   ketorolac (TORADOL) injection 15 mg (15 mg Intravenous $Given 4/19/24 1246)        Assessments:  1037 I obtained history and examined the patient as noted above.     Independent Interpretation (X-rays, CTs, rhythm strip):  None    Consultations/Discussion of Management or Tests:  None        Social Determinants of Health affecting care:   None    Disposition:  The patient was discharged.     Impression & Plan    CMS Diagnoses: None         Medical Decision Making:  Elizabeth Blanco is a 63-year-old woman who is afebrile and hemodynamically stable.  She has mildly reproducible tenderness over the left paraspinal thoracic back medial to the scapula.  No visible abnormality or rash.  She is neurovascular intact in the left arm and lower extremities.  She has no chest pain or shortness of breath.  She just had a CT PE study of the [facility] that was unremarkable.  I see no indication to repeat that as she has no hypoxia and does not feel short of breath and has no pleuritic pain. EKG without ischemic changes and troponin within normal limits. She declined a chest xray. Pain seem to be in the thoracic back near the paraspinal area. She has some radicular pain in the left arm but is neurovascularly intact. She was given the above intervention for pain control.  I do not see indication for any emergent MRI at this time given she is neurovascular intact.  We discussed plan for home we will attempt Medrol Dosepak and initiation of gabapentin.  She actually did follow-up with orthopedics and appeared to be physical therapy visit  today but she states she is also seeing the physician, however, she is now missed that appointment.  I offered [facility] as well she would like to go over this area for orthopedic follow-up.  Discussed supportive care at home and very strict return precautions were given.  We discussed supportive care at home.  We discussed red flag symptoms for which to return.  She was in no distress at time of discharge.      Diagnosis:    ICD-10-CM    1. Acute left-sided thoracic back pain  M54.6            Discharge Medications:  Discharge Medication List as of 4/19/2024  2:19 PM        START taking these medications    Details   gabapentin (NEURONTIN) 100 MG capsule Take 1 capsule (100 mg) by mouth 3 times daily for 10 days, Disp-30 capsule, R-0, E-Prescribe      methylPREDNISolone (MEDROL DOSEPAK) 4 MG tablet therapy pack Follow Package Directions, Disp-21 tablet, R-0, E-Prescribe                Scribe Disclosure:  Behzad SALGUERO, am serving as a scribe at 10:23 AM on 4/19/2024 to document services personally performed by Arturo Daley MD based on my observations and the provider's statements to me.   4/19/2024   Arturo Daley MD Salay, Nicholas J, MD  04/22/24 [id number]

## 2024-04-19 NOTE — ED TRIAGE NOTES
Left shoulder pain goes to chiropractor  every 2 months, recently hung wall paper now having sever pain fentanyl 100 pre hospital, had apt to ortho today unable to make it      Triage Assessment (Adult)       Row Name 04/19/24 1022          Respiratory WDL    Respiratory WDL WDL        Skin Circulation/Temperature WDL    Skin Circulation/Temperature WDL WDL        Cardiac WDL    Cardiac WDL WDL     Cardiac Rhythm NSR        Peripheral/Neurovascular WDL    Peripheral Neurovascular WDL WDL        Cognitive/Neuro/Behavioral WDL    Cognitive/Neuro/Behavioral WDL WDL

## 2024-04-19 NOTE — ED NOTES
Bed: ED16  Expected date: 4/19/24  Expected time:   Means of arrival: Ambulance  Comments:  [facility]

## (undated) DEVICE — GLOVE PROTEXIS POWDER FREE 8.5 ORTHOPEDIC 2D73ET85

## (undated) DEVICE — BONE CEMENT MIXEVAC HI VAC W/CARTRIDGE 0306-563-000

## (undated) DEVICE — DRSG ABDOMINAL 07 1/2X8" 7197D

## (undated) DEVICE — NDL SPINAL 18GA 3.5" 405184

## (undated) DEVICE — GLOVE PROTEXIS BLUE W/NEU-THERA 7.0  2D73EB70

## (undated) DEVICE — BONE CLEANING TIP INTERPULSE  0210-010-000

## (undated) DEVICE — SU VICRYL 1 CTX CR 8X18" J765D

## (undated) DEVICE — SOLUTION WOUND CLEANSING 3/4OZ 10% PVP EA-L3011FB-50

## (undated) DEVICE — BLADE SAW SAGITTAL STRK 25X90X1.27MM HD SYS 6 6125-127-090

## (undated) DEVICE — DRAPE IOBAN INCISE 23X17" 6650EZ

## (undated) DEVICE — LINEN TOWEL PACK X5 5464

## (undated) DEVICE — GOWN IMPERVIOUS ZONED XLG 9041

## (undated) DEVICE — CATH TRAY FOLEY SURESTEP 16FR WDRAIN BAG STLK LATEX A300316A

## (undated) DEVICE — DRAPE SHEET REV FOLD 3/4 9349

## (undated) DEVICE — SU VICRYL 2-0 CT-1 27" UND J259H

## (undated) DEVICE — DRAPE STERI U 1015

## (undated) DEVICE — DRSG KERLIX FLUFFS X5

## (undated) DEVICE — SYR 50ML LL W/O NDL 309653

## (undated) DEVICE — NDL 18GA 1.5" 305196

## (undated) DEVICE — GLOVE PROTEXIS W/NEU-THERA 7.5  2D73TE75

## (undated) DEVICE — SYR 30ML SLIP TIP W/O NDL 302833

## (undated) DEVICE — SYR 01ML 27GA 0.5" NDL TBC 309623

## (undated) DEVICE — PREP CHLORAPREP 26ML TINTED ORANGE  260815

## (undated) DEVICE — GLOVE PROTEXIS POWDER FREE 7.5 ORTHOPEDIC 2D73ET75

## (undated) DEVICE — SYR 03ML LL W/O NDL 309657

## (undated) DEVICE — SOL NACL 0.9% IRRIG 1000ML BOTTLE 07138-09

## (undated) DEVICE — BLADE SAW SAGITTAL STRK 21X90X1.27MM HD SYS 6 6221-127-090

## (undated) DEVICE — DRSG ADAPTIC 3X8" 6113

## (undated) DEVICE — SUCTION IRR SYSTEM W/O TIP INTERPULSE HANDPIECE 0210-100-000

## (undated) DEVICE — CAST PADDING 6" STERILE 9046S

## (undated) DEVICE — PACK TOTAL KNEE SOP15TKFSD

## (undated) DEVICE — ESU GROUND PAD UNIVERSAL W/O CORD

## (undated) DEVICE — STRATAFIX

## (undated) DEVICE — GLOVE PROTEXIS BLUE W/NEU-THERA 8.5  2D73EB85

## (undated) DEVICE — GOWN IMPERVIOUS SPECIALTY XL/XLONG 39049

## (undated) DEVICE — MANIFOLD NEPTUNE 4 PORT 700-20

## (undated) DEVICE — HOOD FLYTE 0408-800-000

## (undated) DEVICE — NDL 19GA 1.5"

## (undated) DEVICE — SOL NACL 0.9% IRRIG 3000ML BAG 2B7477

## (undated) DEVICE — CAST PADDING 6" UNSTERILE 9046

## (undated) DEVICE — WRAP EZY KNEE

## (undated) DEVICE — Device

## (undated) RX ORDER — KETOROLAC TROMETHAMINE 30 MG/ML
INJECTION, SOLUTION INTRAMUSCULAR; INTRAVENOUS
Status: DISPENSED
Start: 2018-03-23

## (undated) RX ORDER — ACYCLOVIR 200 MG/1
CAPSULE ORAL
Status: DISPENSED
Start: 2018-03-23

## (undated) RX ORDER — PROPOFOL 10 MG/ML
INJECTION, EMULSION INTRAVENOUS
Status: DISPENSED
Start: 2018-03-23

## (undated) RX ORDER — ROPIVACAINE HYDROCHLORIDE 5 MG/ML
INJECTION, SOLUTION EPIDURAL; INFILTRATION; PERINEURAL
Status: DISPENSED
Start: 2018-03-23

## (undated) RX ORDER — FENTANYL CITRATE 50 UG/ML
INJECTION, SOLUTION INTRAMUSCULAR; INTRAVENOUS
Status: DISPENSED
Start: 2018-03-23

## (undated) RX ORDER — ROPIVACAINE HYDROCHLORIDE 2 MG/ML
INJECTION, SOLUTION EPIDURAL; INFILTRATION; PERINEURAL
Status: DISPENSED
Start: 2018-03-23